# Patient Record
Sex: FEMALE | Race: WHITE | NOT HISPANIC OR LATINO | Employment: OTHER | URBAN - METROPOLITAN AREA
[De-identification: names, ages, dates, MRNs, and addresses within clinical notes are randomized per-mention and may not be internally consistent; named-entity substitution may affect disease eponyms.]

---

## 2017-01-08 ENCOUNTER — HOSPITAL ENCOUNTER (EMERGENCY)
Facility: CLINIC | Age: 72
Discharge: HOME | End: 2017-01-08
Attending: EMERGENCY MEDICINE | Admitting: EMERGENCY MEDICINE
Payer: COMMERCIAL

## 2017-01-08 ENCOUNTER — APPOINTMENT (OUTPATIENT)
Dept: CT IMAGING | Facility: CLINIC | Age: 72
End: 2017-01-08
Attending: EMERGENCY MEDICINE
Payer: COMMERCIAL

## 2017-01-08 VITALS
WEIGHT: 136 LBS | BODY MASS INDEX: 21.86 KG/M2 | OXYGEN SATURATION: 95 % | HEART RATE: 76 BPM | SYSTOLIC BLOOD PRESSURE: 154 MMHG | RESPIRATION RATE: 18 BRPM | HEIGHT: 66 IN | TEMPERATURE: 97.7 F | DIASTOLIC BLOOD PRESSURE: 75 MMHG

## 2017-01-08 DIAGNOSIS — G44.209 TENSION HEADACHE: ICD-10-CM

## 2017-01-08 LAB
ALBUMIN UR-MCNC: NEGATIVE MG/DL
ANION GAP SERPL CALCULATED.3IONS-SCNC: 5 MMOL/L (ref 3–14)
APPEARANCE UR: CLEAR
BASOPHILS # BLD AUTO: 0.1 10E9/L (ref 0–0.2)
BASOPHILS NFR BLD AUTO: 0.8 %
BILIRUB UR QL STRIP: NEGATIVE
BUN SERPL-MCNC: 12 MG/DL (ref 7–30)
CALCIUM SERPL-MCNC: 9.3 MG/DL (ref 8.5–10.1)
CHLORIDE SERPL-SCNC: 106 MMOL/L (ref 94–109)
CO2 SERPL-SCNC: 29 MMOL/L (ref 20–32)
COLOR UR AUTO: NORMAL
CREAT SERPL-MCNC: 0.65 MG/DL (ref 0.52–1.04)
DIFFERENTIAL METHOD BLD: NORMAL
EOSINOPHIL # BLD AUTO: 0.2 10E9/L (ref 0–0.7)
EOSINOPHIL NFR BLD AUTO: 3.1 %
ERYTHROCYTE [DISTWIDTH] IN BLOOD BY AUTOMATED COUNT: 12.5 % (ref 10–15)
ERYTHROCYTE [SEDIMENTATION RATE] IN BLOOD BY WESTERGREN METHOD: 9 MM/H (ref 0–30)
GFR SERPL CREATININE-BSD FRML MDRD: 90 ML/MIN/1.7M2
GLUCOSE SERPL-MCNC: 97 MG/DL (ref 70–99)
GLUCOSE UR STRIP-MCNC: NEGATIVE MG/DL
HCT VFR BLD AUTO: 41.9 % (ref 35–47)
HGB BLD-MCNC: 14 G/DL (ref 11.7–15.7)
HGB UR QL STRIP: NEGATIVE
IMM GRANULOCYTES # BLD: 0 10E9/L (ref 0–0.4)
IMM GRANULOCYTES NFR BLD: 0.3 %
KETONES UR STRIP-MCNC: NEGATIVE MG/DL
LEUKOCYTE ESTERASE UR QL STRIP: NEGATIVE
LYMPHOCYTES # BLD AUTO: 1.7 10E9/L (ref 0.8–5.3)
LYMPHOCYTES NFR BLD AUTO: 22.3 %
MCH RBC QN AUTO: 31.4 PG (ref 26.5–33)
MCHC RBC AUTO-ENTMCNC: 33.4 G/DL (ref 31.5–36.5)
MCV RBC AUTO: 94 FL (ref 78–100)
MONOCYTES # BLD AUTO: 0.5 10E9/L (ref 0–1.3)
MONOCYTES NFR BLD AUTO: 6.2 %
NEUTROPHILS # BLD AUTO: 5.2 10E9/L (ref 1.6–8.3)
NEUTROPHILS NFR BLD AUTO: 67.3 %
NITRATE UR QL: NEGATIVE
NRBC # BLD AUTO: 0 10*3/UL
NRBC BLD AUTO-RTO: 0 /100
PH UR STRIP: 7 PH (ref 5–7)
PLATELET # BLD AUTO: 279 10E9/L (ref 150–450)
POTASSIUM SERPL-SCNC: 3.9 MMOL/L (ref 3.4–5.3)
RBC # BLD AUTO: 4.46 10E12/L (ref 3.8–5.2)
RBC #/AREA URNS AUTO: 0 /HPF (ref 0–2)
SODIUM SERPL-SCNC: 140 MMOL/L (ref 133–144)
SP GR UR STRIP: 1 (ref 1–1.03)
URN SPEC COLLECT METH UR: NORMAL
UROBILINOGEN UR STRIP-MCNC: NORMAL MG/DL (ref 0–2)
WBC # BLD AUTO: 7.8 10E9/L (ref 4–11)
WBC #/AREA URNS AUTO: <1 /HPF (ref 0–2)

## 2017-01-08 PROCEDURE — 70450 CT HEAD/BRAIN W/O DYE: CPT

## 2017-01-08 PROCEDURE — 80048 BASIC METABOLIC PNL TOTAL CA: CPT | Performed by: EMERGENCY MEDICINE

## 2017-01-08 PROCEDURE — 25000128 H RX IP 250 OP 636: Performed by: EMERGENCY MEDICINE

## 2017-01-08 PROCEDURE — 85652 RBC SED RATE AUTOMATED: CPT | Performed by: EMERGENCY MEDICINE

## 2017-01-08 PROCEDURE — 25000125 ZZHC RX 250: Performed by: EMERGENCY MEDICINE

## 2017-01-08 PROCEDURE — 96374 THER/PROPH/DIAG INJ IV PUSH: CPT

## 2017-01-08 PROCEDURE — 99285 EMERGENCY DEPT VISIT HI MDM: CPT | Mod: 25

## 2017-01-08 PROCEDURE — 36415 COLL VENOUS BLD VENIPUNCTURE: CPT | Performed by: EMERGENCY MEDICINE

## 2017-01-08 PROCEDURE — 81001 URINALYSIS AUTO W/SCOPE: CPT | Performed by: EMERGENCY MEDICINE

## 2017-01-08 PROCEDURE — 96361 HYDRATE IV INFUSION ADD-ON: CPT

## 2017-01-08 PROCEDURE — 85025 COMPLETE CBC W/AUTO DIFF WBC: CPT | Performed by: EMERGENCY MEDICINE

## 2017-01-08 PROCEDURE — 93005 ELECTROCARDIOGRAM TRACING: CPT

## 2017-01-08 PROCEDURE — 96375 TX/PRO/DX INJ NEW DRUG ADDON: CPT

## 2017-01-08 RX ORDER — DIPHENHYDRAMINE HYDROCHLORIDE 50 MG/ML
25 INJECTION INTRAMUSCULAR; INTRAVENOUS ONCE
Status: COMPLETED | OUTPATIENT
Start: 2017-01-08 | End: 2017-01-08

## 2017-01-08 RX ORDER — SODIUM CHLORIDE 9 MG/ML
1000 INJECTION, SOLUTION INTRAVENOUS CONTINUOUS
Status: DISCONTINUED | OUTPATIENT
Start: 2017-01-08 | End: 2017-01-08 | Stop reason: HOSPADM

## 2017-01-08 RX ADMIN — SODIUM CHLORIDE 1000 ML: 9 INJECTION, SOLUTION INTRAVENOUS at 10:46

## 2017-01-08 RX ADMIN — DIPHENHYDRAMINE HYDROCHLORIDE 25 MG: 50 INJECTION, SOLUTION INTRAMUSCULAR; INTRAVENOUS at 10:46

## 2017-01-08 RX ADMIN — PROCHLORPERAZINE EDISYLATE 10 MG: 5 INJECTION INTRAMUSCULAR; INTRAVENOUS at 10:46

## 2017-01-08 ASSESSMENT — ENCOUNTER SYMPTOMS
ABDOMINAL DISTENTION: 0
HEADACHES: 1
WEAKNESS: 0
ABDOMINAL PAIN: 0

## 2017-01-08 NOTE — ED AVS SNAPSHOT
[facility] Emergency Department    [address]    [facility] [address]    Phone:  [phone]    Fax:  [phone]                                       Romy Schwartz   MRN: [id number]    Department:  [facility] Emergency Department   Date of Visit:  1/8/2017           After Visit Summary Signature Page     I have received my discharge instructions, and my questions have been answered. I have discussed any challenges I see with this plan with the nurse or doctor.    ..........................................................................................................................................  Patient/Patient Representative Signature      ..........................................................................................................................................  Patient Representative Print Name and Relationship to Patient    ..................................................               ................................................  Date                                            Time    ..........................................................................................................................................  Reviewed by Signature/Title    ...................................................              ..............................................  Date                                                            Time

## 2017-01-08 NOTE — ED NOTES
Pt presents to ED for evaluation of headache since Tuesday.  Pt taking tylenol with minimal relief.  Pt notes today the headache is radiating down in to her jaw.  ABCD intact.

## 2017-01-08 NOTE — ED AVS SNAPSHOT
[facility] Emergency Department    [address]    [facility] [address]    Phone:  [phone]    Fax:  [phone]                                       Romy Schwartz   MRN: [id number]    Department:  [facility] Emergency Department   Date of Visit:  1/8/2017           Patient Information     Date Of Birth          1945        Your diagnoses for this visit were:     Tension headache        You were seen by Sanjeev Galan MD.      Follow-up Information     Follow up with Mick Mendenhall MD In 2 days.    Specialty:  Internal Medicine    Why:  as scheduled    Contact information:    [facility]  [address] [facility] [address] [phone]          Discharge Instructions          * HEADACHE [unspecified]    The cause of your headache today is not clear, but it does not appear to be the sign of any serious illness.  Under stress, some people tense the muscles of their shoulder, neck and scalp without knowing it. If this condition lasts long enough, a TENSION HEADACHE can occur.  A MIGRAINE HEADACHE is caused by changes in blood flow to the brain. It can be mild or severe. A migraine attack may be triggered by emotional stress, hormone changes during the menstrual cycle, oral contraceptives, alcohol use, certain foods containing tyramine, eye strain, weather changes, missing meals, lack of sleep or oversleeping.  Other causes of headache include a viral illness, sinus, ear or throat infection, dental pain and TMJ (jaw joint) pain.  HOME CARE:    If you were given pain medicine for this headache, do not drive yourself home. Arrange for a ride, instead. When you get home, try to sleep. You should feel much better when you wake up.    If you are having nausea or vomiting, follow a light diet until your headache is relieved.    If you have a migraine type headache, use sunglasses when in the daylight or around bright indoor lighting until symptoms  improve. Bright glaring light can worsen this kind of headache.  FOLLOW UP with your doctor if the headache is not better within the next 24 hours. If you have frequent headaches you should discuss a treatment plan with your primary care doctor. By being aware of the earliest signs of headache, and starting treatment right away, you may be able to stop the pain yourself.  GET PROMPT MEDICAL ATTENTION if any of the following occur:    Worsening of your head pain or no improvement within 24 hours    Repeated vomiting (unable to keep liquids down)    Fever over 101 F (38.3 C)    Stiff neck    Extreme drowsiness, confusion or fainting    Weakness of an arm or leg or one side of the face    Difficulty with speech or vision    7249-3949 [facility], [address] [place] [address], [place] [address]. All rights reserved. This information is not intended as a substitute for professional medical care. Always follow your healthcare professional's instructions.      Future Appointments        Provider Department Dept Phone Center    1/10/2017 7:20 AM Mick Mendenhall MD [facility] [phone]       24 Hour Appointment Hotline       To make an appointment at any [facility], call [phone]). If you don't have a family doctor or clinic, we will help you find one. [facility] are conveniently located to serve the needs of you and your family.             Review of your medicines      Our records show that you are taking the medicines listed below. If these are incorrect, please call your family doctor or clinic.        Dose / Directions Last dose taken    ascorbic acid 1000 MG Tabs tablet        1 TABLET DAILY   Refills:  0        aspirin 81 MG tablet   Dose:  1 tablet        Take 1 tablet by mouth daily.   Refills:  0        BIOTIN 5000 PO        Refills:  0        calcium carbonate 500 MG tablet   Commonly known as:  OS-GERALDINE 500 mg Jena. Ca   Dose:  500 mg        Take 500 mg  by mouth 2 times daily   Refills:  0        cefUROXime 500 MG tablet   Commonly known as:  CEFTIN   Dose:  500 mg   Quantity:  20 tablet        Take 1 tablet (500 mg) by mouth 2 times daily   Refills:  0        cholecalciferol 1000 UNITS capsule   Commonly known as:  vitamin  -D   Dose:  1 capsule        Take 1 capsule by mouth daily.   Refills:  0        fluticasone 50 MCG/ACT spray   Commonly known as:  FLONASE   Dose:  2 spray   Quantity:  16 g        Spray 2 sprays into both nostrils daily   Refills:  0        Multiple vitamin Tabs        daily   Refills:  0        order for DME   Quantity:  1 Device        Equipment being ordered: right flat sole shoe   Refills:  0                Procedures and tests performed during your visit     Basic metabolic panel    CBC with platelets differential    CT Head w/o Contrast    EKG 12-lead, tracing only    Erythrocyte sedimentation rate auto    UA with Microscopic      Orders Needing Specimen Collection     None      Pending Results     Date and Time Order Name Status Description    1/8/2017 1021 EKG 12-lead, tracing only Preliminary     1/8/2017 1021 CT Head w/o Contrast Preliminary             Pending Culture Results     No orders found from 1/7/2017 to 1/9/2017.       Test Results from your hospital stay           1/8/2017 12:18 PM - Interface, Radiant Ib      Narrative     CT HEAD WITHOUT CONTRAST 1/8/2017 11:46 AM     HISTORY:  Headache.    TECHNIQUE: Axial images of the head without IV contrast material.  Radiation dose for this scan was reduced using automated exposure  control, adjustment of the mA and/or kV according to patient size, or  iterative reconstruction technique.    COMPARISON: None.    FINDINGS: No intracranial hemorrhage, mass lesion, or acute infarct is  seen. Some age-related atrophy. There is mild white matter  low-density, likely related to small vessel ischemic disease. Mild  age-indeterminate right mastoid fluid.         Impression     IMPRESSION:  No acute intracranial pathology. Mild age-indeterminate  right mastoid fluid.         1/8/2017 11:03 AM - Interface, Flexilab Results      Component Results     Component Value Ref Range & Units Status    Color Urine Light Yellow  Final    Appearance Urine Clear  Final    Glucose Urine Negative NEG mg/dL Final    Bilirubin Urine Negative NEG Final    Ketones Urine Negative NEG mg/dL Final    Specific Gravity Urine 1.004 1.003 - 1.035 Final    Blood Urine Negative NEG Final    pH Urine 7.0 5.0 - 7.0 pH Final    Protein Albumin Urine Negative NEG mg/dL Final    Urobilinogen mg/dL Normal 0.0 - 2.0 mg/dL Final    Nitrite Urine Negative NEG Final    Leukocyte Esterase Urine Negative NEG Final    Source Midstream Urine  Final    WBC Urine <1 0 - 2 /HPF Final    RBC Urine 0 0 - 2 /HPF Final         1/8/2017 11:22 AM - Interface, Flexilab Results      Component Results     Component Value Ref Range & Units Status    Sodium 140 133 - 144 mmol/L Final    Potassium 3.9 3.4 - 5.3 mmol/L Final    Chloride 106 94 - 109 mmol/L Final    Carbon Dioxide 29 20 - 32 mmol/L Final    Anion Gap 5 3 - 14 mmol/L Final    Glucose 97 70 - 99 mg/dL Final    Urea Nitrogen 12 7 - 30 mg/dL Final    Creatinine 0.65 0.52 - 1.04 mg/dL Final    GFR Estimate 90 >60 mL/min/1.7m2 Final    Non  GFR Calc    GFR Estimate If Black >90   GFR Calc   >60 mL/min/1.7m2 Final    Calcium 9.3 8.5 - 10.1 mg/dL Final         1/8/2017 11:09 AM - Interface, Flexilab Results      Component Results     Component Value Ref Range & Units Status    WBC 7.8 4.0 - 11.0 10e9/L Final    RBC Count 4.46 3.8 - 5.2 10e12/L Final    Hemoglobin 14.0 11.7 - 15.7 g/dL Final    Hematocrit 41.9 35.0 - 47.0 % Final    MCV 94 78 - 100 fl Final    MCH 31.4 26.5 - 33.0 pg Final    MCHC 33.4 31.5 - 36.5 g/dL Final    RDW 12.5 10.0 - 15.0 % Final    Platelet Count 279 150 - 450 10e9/L Final    Diff Method Automated Method  Final    % Neutrophils 67.3 % Final     % Lymphocytes 22.3 % Final    % Monocytes 6.2 % Final    % Eosinophils 3.1 % Final    % Basophils 0.8 % Final    % Immature Granulocytes 0.3 % Final    Nucleated RBCs 0 0 /100 Final    Absolute Neutrophil 5.2 1.6 - 8.3 10e9/L Final    Absolute Lymphocytes 1.7 0.8 - 5.3 10e9/L Final    Absolute Monocytes 0.5 0.0 - 1.3 10e9/L Final    Absolute Eosinophils 0.2 0.0 - 0.7 10e9/L Final    Absolute Basophils 0.1 0.0 - 0.2 10e9/L Final    Abs Immature Granulocytes 0.0 0 - 0.4 10e9/L Final    Absolute Nucleated RBC 0.0  Final         1/8/2017 11:21 AM - Interface, Flexilab Results      Component Results     Component Value Ref Range & Units Status    Sed Rate 9 0 - 30 mm/h Final                Clinical Quality Measure: Blood Pressure Screening     Your blood pressure was checked while you were in the emergency department today. The last reading we obtained was  BP: (!) 175/91 mmHg . Please read the guidelines below about what these numbers mean and what you should do about them.  If your systolic blood pressure (the top number) is less than 120 and your diastolic blood pressure (the bottom number) is less than 80, then your blood pressure is normal. There is nothing more that you need to do about it.  If your systolic blood pressure (the top number) is 120-139 or your diastolic blood pressure (the bottom number) is 80-89, your blood pressure may be higher than it should be. You should have your blood pressure rechecked within a year by a primary care provider.  If your systolic blood pressure (the top number) is 140 or greater or your diastolic blood pressure (the bottom number) is 90 or greater, you may have high blood pressure. High blood pressure is treatable, but if left untreated over time it can put you at risk for heart attack, stroke, or kidney failure. You should have your blood pressure rechecked by a primary care provider within the next 4 weeks.  If your provider in the emergency department today gave you  specific instructions to follow-up with your doctor or provider even sooner than that, you should follow that instruction and not wait for up to 4 weeks for your follow-up visit.        Thank you for choosing [facility]       Thank you for choosing [facility] for your care. Our goal is always to provide you with excellent care. Hearing back from our patients is one way we can continue to improve our services. Please take a few minutes to complete the written survey that you may receive in the mail after you visit with us. Thank you!        [facility] Information     [facility] gives you secure access to your electronic health record. If you see a primary care provider, you can also send messages to your care team and make appointments. If you have questions, please call your primary care clinic.  If you do not have a primary care provider, please call [phone] and they will assist you.        Care EveryWhere ID     This is your Care EveryWhere ID. This could be used by other organizations to access your [facility] medical records  [id number]        After Visit Summary       This is your record. Keep this with you and show to your community pharmacist(s) and doctor(s) at your next visit.

## 2017-01-08 NOTE — ED PROVIDER NOTES
"  History     Chief Complaint:  Headache      HPI   Romy Schwartz is a 71 year old female who presents with headache. The patient reports 5 days ago she gradually developed a headache localized to the back of her head which radiates down into her face that has been constant since. The patient notes she typically does not have headaches, although sometimes with illness, but feels this is more severe and unlike previous headaches. The patient denies difficulty with vision/hearing, difficulties with swallowing, rash, focal weakness, significant abdominal pain, chest pain, leg swelling, or any associated symptoms. The patient notes prior to onset of her symptoms she had what she believes was an anxiety attack but is unsure if this is related.     Allergies:  The patient has no known allergies to medications.       Medications:    Ceftin  Flonase  Aspirin 81 mg    Past Medical History:    Osteopenia  Arthritis     Past Surgical History:    BTL  Tooth extraction  Spine fusion  Open reduction humerus    Family History:    Heart disease  Lipids  Cancer    Social History:  Single.  The patient denies smoking.   The patient consumes alcohol occasionally.      Review of Systems   Eyes: Negative for visual disturbance.   Cardiovascular: Negative for chest pain and leg swelling.   Gastrointestinal: Negative for abdominal pain and abdominal distention.   Skin: Negative for rash.   Neurological: Positive for headaches. Negative for weakness.   All other systems reviewed and are negative.    Physical Exam   First Vitals:  BP: (!) 175/91 mmHg  Pulse: 76  Temp: 97.7  F (36.5  C)  Resp: 18  Height: 167.6 cm (5' 6\")  Weight: 61.689 kg (136 lb)  SpO2: 98 %    Physical Exam  Constitutional: Patient is well appearing. No distress.  Head: Atraumatic.  Mouth/Throat: Oropharynx is clear and moist. No oropharyngeal exudate.  Eyes: Conjunctivae and EOM are normal. No scleral icterus.  Neck: Normal range of motion. Neck supple. " "  Cardiovascular: Normal rate, regular rhythm, normal heart sounds and intact distal pulses.   Pulmonary/Chest: Breath sounds normal. No respiratory distress.  Abdominal: Soft. Bowel sounds are normal. No distension. No tenderness. No rebound or guarding.   Musculoskeletal: Normal range of motion. No edema or tenderness.   Neuro: Alert, oriented x3, PERRL, EOMI, CN 2-7 and 9-12 intact, 5/5 grasp BUE, 5/5 elbow flexion and extension BUE, 5/5 shoulder abduction BUE, 5/5 hip flexion, knee flexion, knee extension, plantar and dorsiflexion BLE, no pronator drift, normal gait, negative romberg, no dysdiadochokinesia, normal finger-nose-finger testing    Skin: Warm and dry. No rash noted. Not diaphoretic.     Emergency Department Course   ECG:  Completed at 1032.  Read at 1035.   Rate 69 bpm. OK interval 132. QRS duration 86. QT/QTc 388/415. P-R-T axes 73 60 47. normal sinus rhythm, normal ECG     Imaging:  Radiographic findings were communicated with the patient who voiced understanding of the findings.    CT Head w/o Contrast:   No acute intracranial pathology. Mild age-indeterminate  right mastoid fluid.  Results per radiology.     Laboratory:  BMP: WNL (Creat 0.65)   CBC: WNL (WBC 7.8, HGB 14.0, )   ESR: 9  UA: WNL    Interventions:  Benadryl 25 mg IV  Compazine 10 mg IV  0.9% sodium chloride infusion 1000 mL    Emergency Department Course:  Nursing notes and vitals reviewed.  I performed an exam of the patient as documented above.     Blood drawn and urine collected. This was sent to the lab for further testing, results above.     The above imaging study(s) and ECG were ordered with results noted above.     The patient received the intervention(s) above.     Upon reevaluation the patient is \"90 to 100% better.\" She has eaten and is requesting discharge. The patient has PCP appointment scheduled for this coming Tuesday in 2 days.     Findings and plan explained to the Patient. Patient discharged home with " instructions regarding supportive care, medications, and reasons to return. The importance of close follow-up was reviewed.     Impression & Plan    Medical Decision Making:  No red flags HA.  Great relief in ED.  Unlikely vascular or ICH or other surgical emergency also screened for systemic causes and neg at this time.  Feels very reassured and wants to go home.      All questions and concerns were answered. The patient was discharged home and recommended to follow up with his primary physician and return with any new or worsening symptoms.     Diagnosis:    ICD-10-CM    1. Tension headache G44.209        Disposition:  Discharged.    Irwin PÉREZ, am serving as a scribe at 12:36 PM on 1/8/2017 to document services personally performed by Sanjeev Galan MD, based on my observations and the provider's statements to me.    [facility] EMERGENCY DEPARTMENT        Sanjeev Galan MD  01/08/17 2132

## 2017-01-08 NOTE — DISCHARGE INSTRUCTIONS
* HEADACHE [unspecified]    The cause of your headache today is not clear, but it does not appear to be the sign of any serious illness.  Under stress, some people tense the muscles of their shoulder, neck and scalp without knowing it. If this condition lasts long enough, a TENSION HEADACHE can occur.  A MIGRAINE HEADACHE is caused by changes in blood flow to the brain. It can be mild or severe. A migraine attack may be triggered by emotional stress, hormone changes during the menstrual cycle, oral contraceptives, alcohol use, certain foods containing tyramine, eye strain, weather changes, missing meals, lack of sleep or oversleeping.  Other causes of headache include a viral illness, sinus, ear or throat infection, dental pain and TMJ (jaw joint) pain.  HOME CARE:    If you were given pain medicine for this headache, do not drive yourself home. Arrange for a ride, instead. When you get home, try to sleep. You should feel much better when you wake up.    If you are having nausea or vomiting, follow a light diet until your headache is relieved.    If you have a migraine type headache, use sunglasses when in the daylight or around bright indoor lighting until symptoms improve. Bright glaring light can worsen this kind of headache.  FOLLOW UP with your doctor if the headache is not better within the next 24 hours. If you have frequent headaches you should discuss a treatment plan with your primary care doctor. By being aware of the earliest signs of headache, and starting treatment right away, you may be able to stop the pain yourself.  GET PROMPT MEDICAL ATTENTION if any of the following occur:    Worsening of your head pain or no improvement within 24 hours    Repeated vomiting (unable to keep liquids down)    Fever over 101 F (38.3 C)    Stiff neck    Extreme drowsiness, confusion or fainting    Weakness of an arm or leg or one side of the face    Difficulty with speech or vision    5825-4355 [address], [address]  [place] [address]. All rights reserved. This information is not intended as a substitute for professional medical care. Always follow your healthcare professional's instructions.

## 2017-01-09 LAB — INTERPRETATION ECG - MUSE: NORMAL

## 2017-01-10 ENCOUNTER — RADIANT APPOINTMENT (OUTPATIENT)
Dept: MAMMOGRAPHY | Facility: CLINIC | Age: 72
End: 2017-01-10
Attending: INTERNAL MEDICINE
Payer: COMMERCIAL

## 2017-01-10 ENCOUNTER — OFFICE VISIT (OUTPATIENT)
Dept: INTERNAL MEDICINE | Facility: CLINIC | Age: 72
End: 2017-01-10
Payer: COMMERCIAL

## 2017-01-10 ENCOUNTER — TELEPHONE (OUTPATIENT)
Dept: INTERNAL MEDICINE | Facility: CLINIC | Age: 72
End: 2017-01-10

## 2017-01-10 VITALS
HEART RATE: 71 BPM | WEIGHT: 136.8 LBS | DIASTOLIC BLOOD PRESSURE: 72 MMHG | TEMPERATURE: 98 F | OXYGEN SATURATION: 99 % | HEIGHT: 66 IN | SYSTOLIC BLOOD PRESSURE: 122 MMHG | BODY MASS INDEX: 21.98 KG/M2

## 2017-01-10 DIAGNOSIS — Z00.00 MEDICARE ANNUAL WELLNESS VISIT, SUBSEQUENT: Primary | ICD-10-CM

## 2017-01-10 DIAGNOSIS — Z12.11 COLON CANCER SCREENING: ICD-10-CM

## 2017-01-10 DIAGNOSIS — Z12.31 VISIT FOR SCREENING MAMMOGRAM: ICD-10-CM

## 2017-01-10 DIAGNOSIS — Z13.6 CARDIOVASCULAR SCREENING; LDL GOAL LESS THAN 160: ICD-10-CM

## 2017-01-10 DIAGNOSIS — Z23 NEED FOR TD VACCINE: ICD-10-CM

## 2017-01-10 LAB
CHOLEST SERPL-MCNC: 230 MG/DL
HDLC SERPL-MCNC: 78 MG/DL
LDLC SERPL CALC-MCNC: 135 MG/DL
NONHDLC SERPL-MCNC: 152 MG/DL
TRIGL SERPL-MCNC: 83 MG/DL

## 2017-01-10 PROCEDURE — 80061 LIPID PANEL: CPT | Performed by: INTERNAL MEDICINE

## 2017-01-10 PROCEDURE — G0202 SCR MAMMO BI INCL CAD: HCPCS | Mod: TC

## 2017-01-10 PROCEDURE — 36415 COLL VENOUS BLD VENIPUNCTURE: CPT | Performed by: INTERNAL MEDICINE

## 2017-01-10 PROCEDURE — 99397 PER PM REEVAL EST PAT 65+ YR: CPT | Mod: 25 | Performed by: INTERNAL MEDICINE

## 2017-01-10 PROCEDURE — 90471 IMMUNIZATION ADMIN: CPT | Performed by: INTERNAL MEDICINE

## 2017-01-10 PROCEDURE — 90714 TD VACC NO PRESV 7 YRS+ IM: CPT | Performed by: INTERNAL MEDICINE

## 2017-01-10 NOTE — PATIENT INSTRUCTIONS
Services Typically covered by Medicare Recommended Completed   Vaccines    Pneumonoccol    Influenza    Hepatitis B (if medium/high risk)     Once for patients after age 65    Yearly  Medium/high risk factors:    End Stage Kidney Disease    Hemophiliacs who received Factor XIII or IX concentrates    Clients of institutions for developmentally disabled    Persons who live in same house as a Hepatitis B carrier    Homosexual men    Illicit injectable drug users    Health care workers     Mammogram Covered: One-time screen between age 35-39, annually for age 40+     Pap and Pelvic Exam Covered: Annually if  high risk,  or childbearing age with abnormal Pap in last 3 years.  Q24 months for all other women     Prostate Cancer Screening    Digital rectal exam    PSA Covered: Annually for all men > age 50     Corolrectal Cancer Screening Screening colonoscopy every 10 years, more often for high risk patients     Diabetes Self-Management Training Requires referral by treating physician for patient with diabetes     Diabetes Screening    Fasting blood sugar or glucose tolerance test   Once yearly, twice yearly if prediabetic     Cardiovascular Screening Blood Tests    Total Cholesterol    HDL    Triglycerides Every 5 years     Medical Nutrition Therapy for Diabetes or Renal Disease Requires referral by treating physician for patient with diabetes or kidney disease     Glaucoma Screening Annually for patients with one of the following risk factors:    Diabetes Mellitus    Family history of Glaucoma    -American age 50 and over    -American age 65 and over     Bone Mass Measurement Every 24 months if one of the following risk factors:    Estrogen deficiency    Vertebral abnormalities on x-ray indicative of Osteoporosis, Osteopenia, or Vertebral fracture    Receiving/expected to receive the equivalent of at least 5 mg of Prednisone per day for > 3 months    Hyperparathyroidism    Patient being monitored for  response to Osteoporosis Therapy     One-time AAA screen  Must be ordered as part of Medicare IPPE   Any patient with a family history of AAA    Males Age 65-75, with history of smoking at least 100 cigarettes in lifetime     Smoking Cessation Counseling Beneficiaries who use tobacco are eligible to receive 2 cessation attempts per year; each attempt includes maximum of 4 sessions     HIV Screening Annually for beneficiaries at increased risk:       Increased risk for HIV infection is defined in the  National Coverage Determinations (NCD) Manual,  Publication 100-03 Sections 190.14 (diagnostic) and 210.7 (screening). See http://www.cms.gov/manuals/downloads/rxf535e6_Hwsd1.pdf and http://www.cms.gov/manuals/downloads/qxi223h4_Flri6.pdf on the Internet.  Three times per pregnancy for beneficiaries who are pregnant.     Future Annual Wellness Visit Annually, for all beneficiaries.

## 2017-01-10 NOTE — PROGRESS NOTES
SUBJECTIVE:                                                            Romy Schwartz is a 71 year old female who presents for Preventive Visit.    Are you in the first 12 months of your Medicare Part B coverage?  No    Healthy Habits:    Do you get at least three servings of calcium containing foods daily (dairy, green leafy vegetables, etc.)? yes    Amount of exercise or daily activities, outside of work: 7 day(s) per week    Problems taking medications regularly not applicable    Medication side effects: No    Have you had an eye exam in the past two years? yes    Do you see a dentist twice per year? yes    Do you have sleep apnea, excessive snoring or daytime drowsiness?no    Other concerns: None    ED/UC Followup:    Facility:  [facility]  Date of visit: 1/8/16  Reason for visit: Tension HA  Current Status: Resolved       All Histories reviewed and updated in threadsy as appropriate.  Social History   Substance Use Topics     Smoking status: Never Smoker      Smokeless tobacco: Never Used     Alcohol Use: Yes      Comment: occasional       The patient does not drink >3 drinks per day nor >7 drinks per week.    Today's PHQ-2 Score:   PHQ-2 ( 1999 Pfizer) 1/10/2017 12/15/2015   Q1: Little interest or pleasure in doing things 0 0   Q2: Feeling down, depressed or hopeless 0 0   PHQ-2 Score 0 0       Do you feel safe in your environment - Yes    Do you have a Health Care Directive?: Yes: Advance Directive has been received and scanned.    Current providers sharing in care for this patient include:   Patient Care Team:  Mick Mendenhall MD as PCP - General (Internal Medicine)      Hearing impairment: No    Ability to successfully perform activities of daily living: Yes, no assistance needed     Fall risk:  Fall Risk Assessment not completed.    Home safety:  none identified      The following health maintenance items are reviewed in Epic and correct as of today:  Health Maintenance   Topic Date Due     FALL RISK  "ASSESSMENT  12/15/2016     TETANUS Q10 YR  07/05/2017     INFLUENZA VACCINE (SYSTEM ASSIGNED)  09/01/2017     ADVANCE DIRECTIVE PLANNING Q5 YRS (NO INBASKET)  11/07/2017     MAMMO SCREEN Q2 YR (SYSTEM ASSIGNED)  12/30/2017     LIPID SCREEN Q5 YR FEMALE (SYSTEM ASSIGNED)  12/15/2020     COLONOSCOPY Q10 YR INBASKET MESSAGE  05/06/2025     DEXA SCAN SCREENING (SYSTEM ASSIGNED)  Completed     PNEUMOCOCCAL  Completed         Immunization History   Administered Date(s) Administered     Influenza (High Dose) 3 valent vaccine 10/17/2014, 12/15/2015, 10/20/2016     Influenza (IIV3) 11/02/1999, 11/20/2010, 11/13/2012     Influenza Vaccine IM 3yrs+ 4 Valent IIV4 11/12/2013     Pneumococcal (PCV 13) 12/15/2015     Pneumococcal 23 valent 06/08/2010     TD (ADULT, 7+) 07/05/2007     Zoster vaccine, live 05/16/2012        ROS:  C: NEGATIVE for fever, chills, change in weight  E/M: NEGATIVE for ear, mouth and throat problems  R: NEGATIVE for significant cough or SOB  CV: NEGATIVE for chest pain, palpitations or peripheral edema  GI: NEGATIVE for nausea, abdominal pain, heartburn, or change in bowel habits  : NEGATIVE for frequency, dysuria, or hematuria  M: NEGATIVE for significant arthralgias or myalgia  N: NEGATIVE for weakness, dizziness or paresthesias  H: NEGATIVE for bleeding problems  P: NEGATIVE for changes in mood or affect      OBJECTIVE:                                                            LMP 03/20/1995 Estimated body mass index is 22.09 kg/(m^2) as calculated from the following:    Height as of this encounter: 5' 6\" (1.676 m).    Weight as of this encounter: 136 lb 12.8 oz (62.052 kg).   /72 mmHg  Pulse 71  Temp(Src) 98  F (36.7  C) (Oral)  Ht 5' 6\" (1.676 m)  Wt 136 lb 12.8 oz (62.052 kg)  BMI 22.09 kg/m2  SpO2 99%  LMP 03/20/1995    EXAM:   GENERAL: healthy, alert and no distress  EYES: Eyes grossly normal to inspection, PERRL and conjunctivae and sclerae normal  HENT: ear canals and TM's normal, " "nose and mouth without ulcers or lesions  NECK: no adenopathy, no asymmetry, masses, or scars and thyroid normal to palpation  RESP: lungs clear to auscultation - no rales, rhonchi or wheezes  CV: regular rate and rhythm, normal S1 S2, no S3 or S4, no murmur, click or rub, no peripheral edema and peripheral pulses strong  ABDOMEN: soft, nontender, no hepatosplenomegaly, no masses and bowel sounds normal  MS: no gross musculoskeletal defects noted, no edema  NEURO: No focal changes  PSYCH: mentation appears normal, affect normal/bright    ASSESSMENT / PLAN:                                                            1. Medicare annual wellness visit, subsequent  As ordered    2. CARDIOVASCULAR SCREENING; LDL GOAL LESS THAN 160  Stable as noted  - Lipid Profile    3. Colon cancer screening  As ordered  - Fecal colorectal cancer screen (FIT); Future    4. Visit for screening mammogram  recommended  - *MA Screening Digital Bilateral; Future    End of Life Planning:  Patient currently has an advanced directive: Yes.  Practitioner is supportive of decision.    COUNSELING:  Reviewed preventive health counseling, as reflected in patient instructions       Regular exercise       Healthy diet/nutrition      Estimated body mass index is 21.96 kg/(m^2) as calculated from the following:    Height as of 1/8/17: 5' 6\" (1.676 m).    Weight as of 1/8/17: 136 lb (61.689 kg).     reports that she has never smoked. She has never used smokeless tobacco.      Appropriate preventive services were discussed with this patient, including applicable screening as appropriate for cardiovascular disease, diabetes, osteopenia/osteoporosis, and glaucoma.  As appropriate for age/gender, discussed screening for colorectal cancer, prostate cancer, breast cancer, and cervical cancer. Checklist reviewing preventive services available has been given to the patient.    Reviewed patients plan of care and provided an AVS. The Basic Care Plan (routine " screening as documented in Health Maintenance) for Romy meets the Care Plan requirement. This Care Plan has been established and reviewed with the Patient.    Counseling Resources:  ATP IV Guidelines  Pooled Cohorts Equation Calculator  Breast Cancer Risk Calculator  FRAX Risk Assessment  ICSI Preventive Guidelines  Dietary Guidelines for Americans, 2010  USDA's MyPlate  ASA Prophylaxis  Lung CA Screening    Mick Mendenhall MD  [facility]    THE MEDICATION LIST HAS BEEN FULLY RECONCILED BY THE M.D. AND THE NURSING STAFF.

## 2017-01-10 NOTE — NURSING NOTE
"Chief Complaint   Patient presents with     Wellness Visit       Initial /72 mmHg  Pulse 71  Temp(Src) 98  F (36.7  C) (Oral)  Ht 5' 6\" (1.676 m)  Wt 136 lb 12.8 oz (62.052 kg)  BMI 22.09 kg/m2  SpO2 99%  LMP 03/20/1995 Estimated body mass index is 22.09 kg/(m^2) as calculated from the following:    Height as of this encounter: 5' 6\" (1.676 m).    Weight as of this encounter: 136 lb 12.8 oz (62.052 kg).  BP completed using cuff size: jen Jaffe CMA      "

## 2017-03-06 ENCOUNTER — OFFICE VISIT (OUTPATIENT)
Dept: URGENT CARE | Facility: URGENT CARE | Age: 72
End: 2017-03-06
Payer: COMMERCIAL

## 2017-03-06 VITALS
TEMPERATURE: 97.9 F | HEART RATE: 68 BPM | BODY MASS INDEX: 22.11 KG/M2 | OXYGEN SATURATION: 97 % | WEIGHT: 137 LBS | SYSTOLIC BLOOD PRESSURE: 126 MMHG | DIASTOLIC BLOOD PRESSURE: 70 MMHG

## 2017-03-06 DIAGNOSIS — R05.9 COUGH: Primary | ICD-10-CM

## 2017-03-06 DIAGNOSIS — R07.0 THROAT PAIN: ICD-10-CM

## 2017-03-06 DIAGNOSIS — Z20.828 EXPOSURE TO INFLUENZA: ICD-10-CM

## 2017-03-06 LAB
DEPRECATED S PYO AG THROAT QL EIA: NORMAL
FLUAV+FLUBV AG SPEC QL: NEGATIVE
FLUAV+FLUBV AG SPEC QL: NORMAL
MICRO REPORT STATUS: NORMAL
SPECIMEN SOURCE: NORMAL
SPECIMEN SOURCE: NORMAL

## 2017-03-06 PROCEDURE — 87880 STREP A ASSAY W/OPTIC: CPT | Performed by: PHYSICIAN ASSISTANT

## 2017-03-06 PROCEDURE — 87081 CULTURE SCREEN ONLY: CPT | Performed by: PHYSICIAN ASSISTANT

## 2017-03-06 PROCEDURE — 99213 OFFICE O/P EST LOW 20 MIN: CPT | Performed by: PHYSICIAN ASSISTANT

## 2017-03-06 PROCEDURE — 87804 INFLUENZA ASSAY W/OPTIC: CPT | Performed by: PHYSICIAN ASSISTANT

## 2017-03-06 RX ORDER — OSELTAMIVIR PHOSPHATE 75 MG/1
75 CAPSULE ORAL DAILY
Qty: 10 CAPSULE | Refills: 0 | Status: SHIPPED | OUTPATIENT
Start: 2017-03-06 | End: 2017-05-08

## 2017-03-06 NOTE — NURSING NOTE
"Chief Complaint   Patient presents with     Flu Symptoms     Exposed to Influenza        Initial /70 (BP Location: Left arm, Patient Position: Chair, Cuff Size: Adult Regular)  Pulse 68  Temp 97.9  F (36.6  C) (Oral)  Wt 137 lb (62.1 kg)  LMP 03/20/1995  SpO2 97%  BMI 22.11 kg/m2 Estimated body mass index is 22.11 kg/(m^2) as calculated from the following:    Height as of 1/10/17: 5' 6\" (1.676 m).    Weight as of this encounter: 137 lb (62.1 kg).  Medication Reconciliation: complete     "

## 2017-03-06 NOTE — PROGRESS NOTES
SUBJECTIVE:   Romy Schwartz is a 71 year old female presenting with a chief complaint of having cough, mild sore throat and influenza exposure.  Onset of symptoms was last night .  Course of illness is none.    Severity mild  Current and Associated symptoms: sore throat, mild cough  Treatment measures tried include none.  Predisposing factors include mother hospitalized with influenza .    Past Medical History   Diagnosis Date     Arthritis      Normal delivery 1966     no complications with pregnancy or delivery     Urinary incontinence         Allergies   Allergen Reactions     No Known Drug Allergies          Social History   Substance Use Topics     Smoking status: Never Smoker     Smokeless tobacco: Never Used     Alcohol use Yes      Comment: occasional       ROS:  CONSTITUTIONAL:NEGATIVE for fever, chills, change in weight  INTEGUMENTARY/SKIN: NEGATIVE for worrisome rashes, moles or lesions  ENT/MOUTH: POSITIVE for nasal congestion, sore throat  RESP:POSITIVE for cough-non productive  CV: NEGATIVE for chest pain, palpitations or peripheral edema  GI: NEGATIVE for nausea, abdominal pain, heartburn, or change in bowel habits  MUSCULOSKELETAL: NEGATIVE for significant arthralgias or myalgia  NEURO: NEGATIVE for weakness, dizziness or paresthesias    OBJECTIVE  :/70 (BP Location: Left arm, Patient Position: Chair, Cuff Size: Adult Regular)  Pulse 68  Temp 97.9  F (36.6  C) (Oral)  Wt 137 lb (62.1 kg)  LMP 03/20/1995  SpO2 97%  BMI 22.11 kg/m2  GENERAL APPEARANCE: healthy, alert and no distress  EYES: EOMI,  PERRL, conjunctiva clear  HENT: ear canals and TM's normal.  Nose and mouth without ulcers, erythema or lesions  NECK: supple, nontender, no lymphadenopathy  RESP: lungs clear to auscultation - no rales, rhonchi or wheezes  CV: regular rates and rhythm, normal S1 S2, no murmur noted  ABDOMEN:  soft, nontender, no HSM or masses and bowel sounds normal  NEURO: Normal strength and tone, sensory exam  grossly normal,  normal speech and mentation  SKIN: no suspicious lesions or rashes    Results for orders placed or performed in visit on 03/06/17   Influenza A/B antigen   Result Value Ref Range    Influenza A/B Agn Specimen Nasopharyngeal     Influenza A Negative NEG    Influenza B  NEG     Negative   Test results must be correlated with clinical data. If necessary, results   should be confirmed by a molecular assay or viral culture.     Strep, Rapid Screen   Result Value Ref Range    Specimen Description Throat     Rapid Strep A Screen       NEGATIVE: No Group A streptococcal antigen detected by immunoassay, await   culture report.      Micro Report Status FINAL 03/06/2017        ASSESSMENT/PLAN      ICD-10-CM    1. Cough R05 Influenza A/B antigen   2. Throat pain R07.0 Strep, Rapid Screen     Beta strep group A culture   3. Exposure to influenza Z20.828 oseltamivir (TAMIFLU) 75 MG capsule       Patient given information about influenza.  Patient understands they are contagious until their fever has resolved without the use of motrin or tylenol.  At that time they can return to school/work.  Patient is to monitor for any worsening symptoms and return to the clinic if this occurs.  The most common complication of influenza is Pneumonia or other respiratory problems especially in those with underlying lung problems including asthma and COPD.  Patient will follow up if this occurs.

## 2017-03-06 NOTE — MR AVS SNAPSHOT
After Visit Summary   3/6/2017    Romy Schwartz    MRN: [id number]           Patient Information     Date Of Birth          1945        Visit Information        Provider Department      3/6/2017 1:30 PM Wm Pagan PA-C [facility]        Today's Diagnoses     Cough    -  1    Throat pain        Exposure to influenza           Follow-ups after your visit        Who to contact     If you have questions or need follow up information about today's clinic visit or your schedule please contact [facility] directly at [phone].  Normal or non-critical lab and imaging results will be communicated to you by [employer], letter or phone within 4 business days after the clinic has received the results. If you do not hear from us within 7 days, please contact the clinic through [employer] or phone. If you have a critical or abnormal lab result, we will notify you by phone as soon as possible.  Submit refill requests through [employer] or call your pharmacy and they will forward the refill request to us. Please allow 3 business days for your refill to be completed.          Additional Information About Your Visit        MyChart Information     [employer] gives you secure access to your electronic health record. If you see a primary care provider, you can also send messages to your care team and make appointments. If you have questions, please call your primary care clinic.  If you do not have a primary care provider, please call [phone] and they will assist you.        Care EveryWhere ID     This is your Care EveryWhere ID. This could be used by other organizations to access your [place] medical records  [id number]        Your Vitals Were     Pulse Temperature Last Period Pulse Oximetry BMI (Body Mass Index)       68 97.9  F (36.6  C) (Oral) 03/20/1995 97% 22.11 kg/m2        Blood Pressure from Last 3 Encounters:   03/06/17 126/70   01/10/17  122/72   01/08/17 154/75    Weight from Last 3 Encounters:   03/06/17 137 lb (62.1 kg)   01/10/17 136 lb 12.8 oz (62.1 kg)   01/08/17 136 lb (61.7 kg)              We Performed the Following     Beta strep group A culture     Influenza A/B antigen     Strep, Rapid Screen          Today's Medication Changes          These changes are accurate as of: 3/6/17  3:09 PM.  If you have any questions, ask your nurse or doctor.               Start taking these medicines.        Dose/Directions    oseltamivir 75 MG capsule   Commonly known as:  TAMIFLU   Used for:  Exposure to influenza   Started by:  Wm Pagan PA-C        Dose:  75 mg   Take 1 capsule (75 mg) by mouth daily   Quantity:  10 capsule   Refills:  0            Where to get your medicines      These medications were sent to [facility] [address] [place] [address] [address] AT [facility] [place]  [address], [place] [address]     Phone:  [phone]     oseltamivir 75 MG capsule                Primary Care Provider Office Phone # Fax #    Mick Mendenhall [phone]       [facility] [address]  [place] [address]        Thank you!     Thank you for choosing [facility]  for your care. Our goal is always to provide you with excellent care. Hearing back from our patients is one way we can continue to improve our services. Please take a few minutes to complete the written survey that you may receive in the mail after your visit with us. Thank you!             Your Updated Medication List - Protect others around you: Learn how to safely use, store and throw away your medicines at www.disposemymeds.org.          This list is accurate as of: 3/6/17  3:09 PM.  Always use your most recent med list.                   Brand Name Dispense Instructions for use    ascorbic acid 1000 MG Tabs tablet      1 TABLET DAILY       aspirin 81 MG tablet      Take 1  tablet by mouth daily.       BIOTIN 5000 PO          calcium carbonate 500 MG tablet    OS-GERALDINE 500 mg [place]. [place]     Take 500 mg by mouth 2 times daily       cholecalciferol 1000 UNITS capsule    vitamin  -D     Take 1 capsule by mouth daily.       fluticasone 50 MCG/ACT spray    FLONASE    16 g    Spray 2 sprays into both nostrils daily       Multiple vitamin Tabs      daily       order for DME     1 Device    Equipment being ordered: right flat sole shoe       oseltamivir 75 MG capsule    TAMIFLU    10 capsule    Take 1 capsule (75 mg) by mouth daily

## 2017-03-08 LAB
BACTERIA SPEC CULT: NORMAL
MICRO REPORT STATUS: NORMAL
SPECIMEN SOURCE: NORMAL

## 2017-05-08 ENCOUNTER — OFFICE VISIT (OUTPATIENT)
Dept: INTERNAL MEDICINE | Facility: CLINIC | Age: 72
End: 2017-05-08
Payer: COMMERCIAL

## 2017-05-08 ENCOUNTER — RADIANT APPOINTMENT (OUTPATIENT)
Dept: GENERAL RADIOLOGY | Facility: CLINIC | Age: 72
End: 2017-05-08
Attending: INTERNAL MEDICINE
Payer: COMMERCIAL

## 2017-05-08 VITALS
DIASTOLIC BLOOD PRESSURE: 72 MMHG | TEMPERATURE: 98.4 F | OXYGEN SATURATION: 98 % | BODY MASS INDEX: 22.18 KG/M2 | HEART RATE: 75 BPM | SYSTOLIC BLOOD PRESSURE: 120 MMHG | HEIGHT: 66 IN | WEIGHT: 138 LBS

## 2017-05-08 DIAGNOSIS — M72.2 PLANTAR FASCIITIS: Primary | ICD-10-CM

## 2017-05-08 DIAGNOSIS — M72.2 PLANTAR FASCIITIS: ICD-10-CM

## 2017-05-08 PROCEDURE — 99213 OFFICE O/P EST LOW 20 MIN: CPT | Performed by: INTERNAL MEDICINE

## 2017-05-08 PROCEDURE — 73630 X-RAY EXAM OF FOOT: CPT | Mod: LT

## 2017-05-08 NOTE — PROGRESS NOTES
SUBJECTIVE:                                                    Romy Schwartz is a 71 year old female who presents to clinic today for the following health issues:    Musculoskeletal problem/pain      Duration: 1 week    Description  Location: Left lateral heel     Intensity:  moderate    Accompanying signs and symptoms: Pain with weight on foot     History  Previous similar problem: no   Previous evaluation:  none    Precipitating or alleviating factors:  Trauma or overuse: no- does a lot of hiking and walking   Aggravating factors include: standing and walking    Therapies tried and outcome: ice and NSAID - help       Problem list and histories reviewed & adjusted, as indicated.  Additional history: as documented    Patient Active Problem List   Diagnosis     Lumbago     Osteopenia     CARDIOVASCULAR SCREENING; LDL GOAL LESS THAN 160     Urgency-frequency syndrome     ACP (advance care planning)     Fracture closed, humerus     Anemia due to blood loss, acute     Anxiety     Past Surgical History:   Procedure Laterality Date     C NONSPECIFIC PROCEDURE      BTL     C NONSPECIFIC PROCEDURE       C SPINE FUSION,ANTER,3 SGMTS      cervical spine fusion C4-5, C5-6, C6-7     HC COLONOSCOPY THRU STOMA, DIAGNOSTIC  05    normal colonoscopy     HC TOOTH EXTRACTION W/FORCEP  1964    4 wisdom teeth removed     OPEN REDUCTION INTERNAL FIXATION HUMERUS DISTAL  2014    Procedure: OPEN REDUCTION INTERNAL FIXATION HUMERUS DISTAL;  Surgeon: Yusuf Hedrick MD;  Location:  OR       Social History   Substance Use Topics     Smoking status: Never Smoker     Smokeless tobacco: Never Used     Alcohol use Yes      Comment: occasional     Family History   Problem Relation Age of Onset     HEART DISEASE Father       age 78 also bypass surg     Lipids Mother      alive b.      CANCER Mother      Non Hodgkins     CANCER Brother      d. age 47 myelodysplasia from chemotherapy nonhodgkins lymphoma      "Family History Negative Brother      alive b.  193     Family History Negative Brother      alive age b. 1940     Cancer - colorectal Maternal Grandmother       colon cancer         Current Outpatient Prescriptions   Medication Sig Dispense Refill     order for DME Equipment being ordered: right flat sole shoe 1 Device 0     BIOTIN 5000 PO        calcium carbonate (OS-GERALDINE 500 MG [place]. [place]) 500 MG tablet Take 500 mg by mouth 2 times daily       aspirin 81 MG tablet Take 1 tablet by mouth daily.       cholecalciferol (VITAMIN-D) 1000 UNIT capsule Take 1 capsule by mouth daily.       ASCORBIC ACID 1000 MG PO TABS 1 TABLET DAILY       MULTIPLE VITAMIN PO TABS daily       Allergies   Allergen Reactions     No Known Drug Allergies      BP Readings from Last 3 Encounters:   17 126/70   01/10/17 122/72   17 154/75    Wt Readings from Last 3 Encounters:   17 137 lb (62.1 kg)   01/10/17 136 lb 12.8 oz (62.1 kg)   17 136 lb (61.7 kg)                    Reviewed and updated as needed this visit by clinical staff  Tobacco  Allergies  Med Hx  Surg Hx  Fam Hx  Soc Hx      Reviewed and updated as needed this visit by Provider         ROS:  C: NEGATIVE for fever, chills, change in weight  E/M: NEGATIVE for ear, mouth and throat problems  R: NEGATIVE for significant cough or SOB  CV: NEGATIVE for chest pain, palpitations or peripheral edema  GI: NEGATIVE for nausea, abdominal pain, heartburn, or change in bowel habits  : NEGATIVE for frequency, dysuria, or hematuria  P: NEGATIVE for changes in mood or affect    OBJECTIVE:                                                    /72  Pulse 75  Temp 98.4  F (36.9  C) (Oral)  Ht 5' 6\" (1.676 m)  Wt 138 lb (62.6 kg)  LMP 1995  SpO2 98%  BMI 22.27 kg/m2  There is no height or weight on file to calculate BMI.  GENERAL: healthy, alert and no distress  RESP: lungs clear to auscultation - no rales, no rhonchi, no wheezes  CV: regular rates and " rhythm, normal S1 S2, no S3 or S4 and no murmur, no click or rub -  ABDOMEN: soft, no tenderness, no  hepatosplenomegaly, no masses, normal bowel sounds  MS: extremities- no gross deformities noted, no edema  MS: +mild pain plantar aspect foot, left, insertion site.  NEURO: strength and tone- normal, sensory exam- grossly normal, mentation- intact, speech- normal, reflexes- symmetric  PSYCH: Alert and oriented times 3; speech- coherent , normal rate and volume; able to articulate logical thoughts, able to abstract reason, no tangential thoughts, no hallucinations or delusions, affect- normal       ASSESSMENT/PLAN:                                                      (M72.2) Plantar fasciitis  (primary encounter diagnosis)  Comment: will image, discussed optios of care  Plan: XR Foot Left G/E 3 Views        Ice, stretching    See Patient Instructions    Mick Mendenhall MD  [facility]

## 2017-05-08 NOTE — MR AVS SNAPSHOT
After Visit Summary   5/8/2017    Romy Schwartz    MRN: [id number]           Patient Information     Date Of Birth          1945        Visit Information        Provider Department      5/8/2017 11:00 AM Mick Mendenhall MD [facility]        Today's Diagnoses     Plantar fasciitis    -  1       Follow-ups after your visit        Follow-up notes from your care team     Return if symptoms worsen or fail to improve.      Future tests that were ordered for you today     Open Future Orders        Priority Expected Expires Ordered    XR Foot Left G/E 3 Views Routine 5/8/2017 5/8/2018 5/8/2017            Who to contact     If you have questions or need follow up information about today's clinic visit or your schedule please contact [facility] directly at [phone].  Normal or non-critical lab and imaging results will be communicated to you by MyChart, letter or phone within 4 business days after the clinic has received the results. If you do not hear from us within 7 days, please contact the clinic through MyChart or phone. If you have a critical or abnormal lab result, we will notify you by phone as soon as possible.  Submit refill requests through [employer] or call your pharmacy and they will forward the refill request to us. Please allow 3 business days for your refill to be completed.          Additional Information About Your Visit        MyChart Information     [employer] gives you secure access to your electronic health record. If you see a primary care provider, you can also send messages to your care team and make appointments. If you have questions, please call your primary care clinic.  If you do not have a primary care provider, please call [phone] and they will assist you.        Care EveryWhere ID     This is your Care EveryWhere ID. This could be used by other organizations to access your [place] medical records  [id number]       "  Your Vitals Were     Pulse Temperature Height Last Period Pulse Oximetry BMI (Body Mass Index)    75 98.4  F (36.9  C) (Oral) 5' 6\" (1.676 m) 03/20/1995 98% 22.27 kg/m2       Blood Pressure from Last 3 Encounters:   05/08/17 120/72   03/06/17 126/70   01/10/17 122/72    Weight from Last 3 Encounters:   05/08/17 138 lb (62.6 kg)   03/06/17 137 lb (62.1 kg)   01/10/17 136 lb 12.8 oz (62.1 kg)               Primary Care Provider Office Phone # Fax #    Mick Mendenhall [phone]       [facility] [place] [address] [place] [id number]        Thank you!     Thank you for choosing [facility]  for your care. Our goal is always to provide you with excellent care. Hearing back from our patients is one way we can continue to improve our services. Please take a few minutes to complete the written survey that you may receive in the mail after your visit with us. Thank you!             Your Updated Medication List - Protect others around you: Learn how to safely use, store and throw away your medicines at www.disposemymeds.org.          This list is accurate as of: 5/8/17 11:08 AM.  Always use your most recent med list.                   Brand Name Dispense Instructions for use    ascorbic acid 1000 MG Tabs tablet      1 TABLET DAILY       aspirin 81 MG tablet      Take 1 tablet by mouth daily.       BIOTIN 5000 PO          calcium carbonate 500 MG tablet    OS-GERALDINE 500 mg [place]. Ca     Take 500 mg by mouth 2 times daily       cholecalciferol 1000 UNITS capsule    vitamin  -D     Take 1 capsule by mouth daily.       Multiple vitamin Tabs      daily       order for DME     1 Device    Equipment being ordered: right flat sole shoe         "

## 2017-05-08 NOTE — NURSING NOTE
"Chief Complaint   Patient presents with     Foot Pain       Initial /72  Pulse 75  Temp 98.4  F (36.9  C) (Oral)  Ht 5' 6\" (1.676 m)  Wt 138 lb (62.6 kg)  LMP 03/20/1995  SpO2 98%  BMI 22.27 kg/m2 Estimated body mass index is 22.27 kg/(m^2) as calculated from the following:    Height as of this encounter: 5' 6\" (1.676 m).    Weight as of this encounter: 138 lb (62.6 kg).  Medication Reconciliation: complete   Ernestine Jaffe CMA      "

## 2017-05-29 ENCOUNTER — OFFICE VISIT (OUTPATIENT)
Dept: URGENT CARE | Facility: URGENT CARE | Age: 72
End: 2017-05-29
Payer: COMMERCIAL

## 2017-05-29 VITALS
WEIGHT: 136.6 LBS | TEMPERATURE: 98.3 F | OXYGEN SATURATION: 99 % | SYSTOLIC BLOOD PRESSURE: 122 MMHG | BODY MASS INDEX: 22.05 KG/M2 | DIASTOLIC BLOOD PRESSURE: 76 MMHG | HEART RATE: 70 BPM

## 2017-05-29 DIAGNOSIS — J98.01 ACUTE BRONCHOSPASM: ICD-10-CM

## 2017-05-29 DIAGNOSIS — R07.89 CHEST TIGHTNESS: ICD-10-CM

## 2017-05-29 DIAGNOSIS — J20.9 ACUTE BRONCHITIS WITH SYMPTOMS > 10 DAYS: Primary | ICD-10-CM

## 2017-05-29 PROCEDURE — 99213 OFFICE O/P EST LOW 20 MIN: CPT | Mod: 25 | Performed by: PHYSICIAN ASSISTANT

## 2017-05-29 PROCEDURE — 94640 AIRWAY INHALATION TREATMENT: CPT | Performed by: PHYSICIAN ASSISTANT

## 2017-05-29 RX ORDER — PREDNISONE 20 MG/1
20 TABLET ORAL 2 TIMES DAILY
Qty: 10 TABLET | Refills: 0 | Status: SHIPPED | OUTPATIENT
Start: 2017-05-29 | End: 2017-09-12

## 2017-05-29 RX ORDER — LEVALBUTEROL INHALATION SOLUTION 0.63 MG/3ML
SOLUTION RESPIRATORY (INHALATION)
Qty: 3 ML | Status: SHIPPED
Start: 2017-05-29 | End: 2017-09-12

## 2017-05-29 RX ORDER — AZITHROMYCIN 250 MG/1
TABLET, FILM COATED ORAL
Qty: 6 TABLET | Refills: 0 | Status: SHIPPED | OUTPATIENT
Start: 2017-05-29 | End: 2017-07-11

## 2017-05-29 RX ORDER — ALBUTEROL SULFATE 90 UG/1
2 AEROSOL, METERED RESPIRATORY (INHALATION) EVERY 6 HOURS
Qty: 1 INHALER | Refills: 0 | Status: SHIPPED | OUTPATIENT
Start: 2017-05-29 | End: 2017-09-12

## 2017-05-29 NOTE — MR AVS SNAPSHOT
After Visit Summary   5/29/2017    Romy Schwartz    MRN: [id number]           Patient Information     Date Of Birth          1945        Visit Information        Provider Department      5/29/2017 11:40 AM Wm Pagan PA-C [facility]        Today's Diagnoses     Acute bronchitis with symptoms > 10 days    -  1    Chest tightness        Acute bronchospasm           Follow-ups after your visit        Who to contact     If you have questions or need follow up information about today's clinic visit or your schedule please contact [facility] directly at [phone].  Normal or non-critical lab and imaging results will be communicated to you by net[employer], letter or phone within 4 business days after the clinic has received the results. If you do not hear from us within 7 days, please contact the clinic through netZentryhart or phone. If you have a critical or abnormal lab result, we will notify you by phone as soon as possible.  Submit refill requests through [employer] or call your pharmacy and they will forward the refill request to us. Please allow 3 business days for your refill to be completed.          Additional Information About Your Visit        MyChart Information     [employer] gives you secure access to your electronic health record. If you see a primary care provider, you can also send messages to your care team and make appointments. If you have questions, please call your primary care clinic.  If you do not have a primary care provider, please call [phone] and they will assist you.        Care EveryWhere ID     This is your Care EveryWhere ID. This could be used by other organizations to access your [facility] medical records  [id number]        Your Vitals Were     Pulse Temperature Last Period Pulse Oximetry BMI (Body Mass Index)       70 98.3  F (36.8  C) (Oral) 03/20/1995 99% 22.05 kg/m2        Blood Pressure from Last 3  Encounters:   05/29/17 122/76   05/08/17 120/72   03/06/17 126/70    Weight from Last 3 Encounters:   05/29/17 136 lb 9.6 oz (62 kg)   05/08/17 138 lb (62.6 kg)   03/06/17 137 lb (62.1 kg)              We Performed the Following     INHALATION/NEBULIZER TREATMENT, INITIAL     INHALATION/NEBULIZER TREATMENT, INITIAL     LEVALBUTEROL UNIT DOSE, 0.5 MG          Today's Medication Changes          These changes are accurate as of: 5/29/17 11:59 PM.  If you have any questions, ask your nurse or doctor.               Start taking these medicines.        Dose/Directions    albuterol 108 (90 BASE) MCG/ACT Inhaler   Commonly known as:  PROVENTIL HFA   Used for:  Acute bronchitis with symptoms > 10 days, Chest tightness, Acute bronchospasm   Started by:  Wm Pagan PA-C        Dose:  2 puff   Inhale 2 puffs into the lungs every 6 hours   Quantity:  1 Inhaler   Refills:  0       azithromycin 250 MG tablet   Commonly known as:  ZITHROMAX   Used for:  Acute bronchitis with symptoms > 10 days   Started by:  Wm Pagan PA-C        2 tabs po qd day 1, then 1 tab po qd days 2-5   Quantity:  6 tablet   Refills:  0       levalbuterol 0.63 MG/3ML neb solution   Commonly known as:  XOPENEX   Used for:  Acute bronchitis with symptoms > 10 days, Chest tightness, Acute bronchospasm   Started by:  Wm Pagan PA-C        1 vial in nebulizer   Quantity:  3 mL   Refills:  0       predniSONE 20 MG tablet   Commonly known as:  DELTASONE   Used for:  Acute bronchospasm, Chest tightness   Started by:  Wm Pagan PA-C        Dose:  20 mg   Take 1 tablet (20 mg) by mouth 2 times daily   Quantity:  10 tablet   Refills:  0            Where to get your medicines      These medications were sent to [id number] - [place], [place] - [address] AT [facility] [address]  [address], [facility] [address]     Phone:  [phone]     albuterol 108 (90 BASE) MCG/ACT Inhaler     azithromycin 250 MG tablet    predniSONE 20 MG tablet         Some of these will need a paper prescription and others can be bought over the counter.  Ask your nurse if you have questions.     You don't need a prescription for these medications     levalbuterol 0.63 MG/3ML neb solution                Primary Care Provider Office Phone # Fax #    Mick Mendenhall [phone]       [facility] [address]  [facility] [address]        Thank you!     Thank you for choosing [facility]  for your care. Our goal is always to provide you with excellent care. Hearing back from our patients is one way we can continue to improve our services. Please take a few minutes to complete the written survey that you may receive in the mail after your visit with us. Thank you!             Your Updated Medication List - Protect others around you: Learn how to safely use, store and throw away your medicines at www.disposemymeds.org.          This list is accurate as of: 5/29/17 11:59 PM.  Always use your most recent med list.                   Brand Name Dispense Instructions for use    albuterol 108 (90 BASE) MCG/ACT Inhaler    PROVENTIL HFA    1 Inhaler    Inhale 2 puffs into the lungs every 6 hours       ascorbic acid 1000 MG Tabs tablet      1 TABLET DAILY       aspirin 81 MG tablet      Take 1 tablet by mouth daily.       azithromycin 250 MG tablet    ZITHROMAX    6 tablet    2 tabs po qd day 1, then 1 tab po qd days 2-5       BIOTIN 5000 PO          calcium carbonate 500 MG tablet    OS-GERALDINE 500 mg [place]. [place]     Take 500 mg by mouth 2 times daily       cholecalciferol 1000 UNITS capsule    vitamin  -D     Take 1 capsule by mouth daily.       levalbuterol 0.63 MG/3ML neb solution    XOPENEX    3 mL    1 vial in nebulizer       Multiple vitamin Tabs      daily       order for DME     1 Device    Equipment being ordered: right flat sole shoe       predniSONE 20 MG tablet     DELTASONE    10 tablet    Take 1 tablet (20 mg) by mouth 2 times daily

## 2017-05-29 NOTE — NURSING NOTE
"Chief Complaint   Patient presents with     Cough     coughing, heavy chest 9x days        Initial /76 (BP Location: Left arm, Patient Position: Chair, Cuff Size: Adult Regular)  Pulse 70  Temp 98.3  F (36.8  C) (Oral)  Wt 136 lb 9.6 oz (62 kg)  LMP 03/20/1995  SpO2 99%  BMI 22.05 kg/m2 Estimated body mass index is 22.05 kg/(m^2) as calculated from the following:    Height as of 5/8/17: 5' 6\" (1.676 m).    Weight as of this encounter: 136 lb 9.6 oz (62 kg).  Medication Reconciliation: complete    "

## 2017-06-01 NOTE — PROGRESS NOTES
SUBJECTIVE:   Romy Schwartz is a 71 year old female presenting with a chief complaint of chest congestion, productive cough and wheezing.  Onset of symptoms was 9 day(s) ago.  Course of illness is worsening.    Severity moderate  Current and Associated symptoms: chest congestion, coughing, wheezing  Treatment measures tried include OTC meds.  Predisposing factors include recent illness.    Past Medical History:   Diagnosis Date     Arthritis      Normal delivery 1966    no complications with pregnancy or delivery     Urinary incontinence         Allergies   Allergen Reactions     No Known Drug Allergies          Social History   Substance Use Topics     Smoking status: Never Smoker     Smokeless tobacco: Never Used     Alcohol use Yes      Comment: occasional       ROS:  CONSTITUTIONAL:NEGATIVE for fever, chills, change in weight  INTEGUMENTARY/SKIN: NEGATIVE for worrisome rashes, moles or lesions  ENT/MOUTH: POSITIVE for nasal congestion, sinus drainage  RESP:POSITIVE for cough-productive and wheezing  CV: NEGATIVE for chest pain, palpitations or peripheral edema  GI: NEGATIVE for nausea, abdominal pain, heartburn, or change in bowel habits  MUSCULOSKELETAL: NEGATIVE for significant arthralgias or myalgia  NEURO: NEGATIVE for weakness, dizziness or paresthesias    OBJECTIVE  :/76 (BP Location: Left arm, Patient Position: Chair, Cuff Size: Adult Regular)  Pulse 70  Temp 98.3  F (36.8  C) (Oral)  Wt 136 lb 9.6 oz (62 kg)  LMP 03/20/1995  SpO2 99%  BMI 22.05 kg/m2  GENERAL APPEARANCE: healthy, alert and no distress  EYES: EOMI,  PERRL, conjunctiva clear  HENT: TM's normal bilaterally, nasal turbinates erythematous, swollen and rhinorrhea   NECK: supple, nontender, no lymphadenopathy  RESP: expiratory wheezes, productive cough  CV: regular rates and rhythm, normal S1 S2, no murmur noted  NEURO: Normal strength and tone, sensory exam grossly normal,  normal speech and mentation  SKIN: no suspicious  "lesions or rashes    xopenex neb treatment given in clinic    Chest xray Negative for acute findings, read by Wm BAL at time of visit.    ASSESSMENT/PLAN:\"      ICD-10-CM    1. Acute bronchitis with symptoms > 10 days J20.9 levalbuterol (XOPENEX) 0.63 MG/3ML neb solution     albuterol (PROVENTIL HFA) 108 (90 BASE) MCG/ACT Inhaler     azithromycin (ZITHROMAX) 250 MG tablet     LEVALBUTEROL UNIT DOSE, 0.5 MG     INHALATION/NEBULIZER TREATMENT, INITIAL   2. Chest tightness R07.89 levalbuterol (XOPENEX) 0.63 MG/3ML neb solution     INHALATION/NEBULIZER TREATMENT, INITIAL     albuterol (PROVENTIL HFA) 108 (90 BASE) MCG/ACT Inhaler     predniSONE (DELTASONE) 20 MG tablet     LEVALBUTEROL UNIT DOSE, 0.5 MG     INHALATION/NEBULIZER TREATMENT, INITIAL   3. Acute bronchospasm J98.01 levalbuterol (XOPENEX) 0.63 MG/3ML neb solution     INHALATION/NEBULIZER TREATMENT, INITIAL     albuterol (PROVENTIL HFA) 108 (90 BASE) MCG/ACT Inhaler     predniSONE (DELTASONE) 20 MG tablet     LEVALBUTEROL UNIT DOSE, 0.5 MG     INHALATION/NEBULIZER TREATMENT, INITIAL       Continue albuterol  Follow up with PCP as needed  "

## 2017-06-26 ENCOUNTER — TELEPHONE (OUTPATIENT)
Dept: INTERNAL MEDICINE | Facility: CLINIC | Age: 72
End: 2017-06-26

## 2017-06-26 DIAGNOSIS — Z13.6 CARDIOVASCULAR SCREENING; LDL GOAL LESS THAN 160: Primary | ICD-10-CM

## 2017-06-26 NOTE — TELEPHONE ENCOUNTER
Reason for Call: Request for an order or referral:    Order or referral being requested: labs    Date needed: as soon as possible    Has the patient been seen by the PCP for this problem? NO    Additional comments: pt had labs done in dec and cholesterol was a little high and wanted another lab order put in to see if cholesterol came down pt has working on getting it down. Please call pt when done.    Phone number Patient can be reached at:  Home number on file [phone] (home)    Best Time:  anytime    Can we leave a detailed message on this number?  YES    Call taken on 6/26/2017 at 1:34 PM by ANTIONE SINGLETON

## 2017-07-11 DIAGNOSIS — Z13.6 CARDIOVASCULAR SCREENING; LDL GOAL LESS THAN 160: ICD-10-CM

## 2017-07-11 LAB
CHOLEST SERPL-MCNC: 217 MG/DL
HDLC SERPL-MCNC: 85 MG/DL
LDLC SERPL CALC-MCNC: 113 MG/DL
NONHDLC SERPL-MCNC: 132 MG/DL
TRIGL SERPL-MCNC: 93 MG/DL

## 2017-07-11 PROCEDURE — 80061 LIPID PANEL: CPT | Performed by: INTERNAL MEDICINE

## 2017-07-11 PROCEDURE — 36415 COLL VENOUS BLD VENIPUNCTURE: CPT | Performed by: INTERNAL MEDICINE

## 2017-09-12 ENCOUNTER — OFFICE VISIT (OUTPATIENT)
Dept: INTERNAL MEDICINE | Facility: CLINIC | Age: 72
End: 2017-09-12
Payer: COMMERCIAL

## 2017-09-12 VITALS
DIASTOLIC BLOOD PRESSURE: 78 MMHG | TEMPERATURE: 98 F | HEIGHT: 66 IN | HEART RATE: 70 BPM | WEIGHT: 138.1 LBS | BODY MASS INDEX: 22.19 KG/M2 | OXYGEN SATURATION: 98 % | SYSTOLIC BLOOD PRESSURE: 118 MMHG

## 2017-09-12 DIAGNOSIS — G44.219 EPISODIC TENSION-TYPE HEADACHE, NOT INTRACTABLE: Primary | ICD-10-CM

## 2017-09-12 PROCEDURE — 99214 OFFICE O/P EST MOD 30 MIN: CPT | Performed by: INTERNAL MEDICINE

## 2017-09-12 NOTE — MR AVS SNAPSHOT
"              After Visit Summary   9/12/2017    Romy Schwartz    MRN: [id number]           Patient Information     Date Of Birth          1945        Visit Information        Provider Department      9/12/2017 7:40 AM Mick Mendenhall MD [facility]        Today's Diagnoses     Episodic tension-type headache, not intractable    -  1       Follow-ups after your visit        Follow-up notes from your care team     Return if symptoms worsen or fail to improve.      Who to contact     If you have questions or need follow up information about today's clinic visit or your schedule please contact [facility] directly at [phone].  Normal or non-critical lab and imaging results will be communicated to you by MyChart, letter or phone within 4 business days after the clinic has received the results. If you do not hear from us within 7 days, please contact the clinic through [employer] or phone. If you have a critical or abnormal lab result, we will notify you by phone as soon as possible.  Submit refill requests through [employer] or call your pharmacy and they will forward the refill request to us. Please allow 3 business days for your refill to be completed.          Additional Information About Your Visit        MyChart Information     [employer] gives you secure access to your electronic health record. If you see a primary care provider, you can also send messages to your care team and make appointments. If you have questions, please call your primary care clinic.  If you do not have a primary care provider, please call [phone] and they will assist you.        Care EveryWhere ID     This is your Care EveryWhere ID. This could be used by other organizations to access your [facility] medical records  [id number]        Your Vitals Were     Pulse Temperature Height Last Period Pulse Oximetry BMI (Body Mass Index)    70 98  F (36.7  C) (Oral) 5' 6\" (1.676 m) 03/20/1995 " 98% 22.29 kg/m2       Blood Pressure from Last 3 Encounters:   09/12/17 118/78   05/29/17 122/76   05/08/17 120/72    Weight from Last 3 Encounters:   09/12/17 138 lb 1.6 oz (62.6 kg)   05/29/17 136 lb 9.6 oz (62 kg)   05/08/17 138 lb (62.6 kg)              Today, you had the following     No orders found for display       Primary Care Provider Office Phone # Fax #    Mick Mendenhall [phone]       [address] [facility] [address]        Equal Access to Services     [facility] : Hadii colleen epstein hadasho Sobeverley, waaxda luqadaha, qaybta kaalmada adecathrynyada, molly diallo. So [facility] [phone].    ATENCIÓN: Si habla español, tiene a han disposición servicios gratuitos de asistencia lingüística. [facility] [phone].    We comply with applicable federal civil rights laws and [place] laws. We do not discriminate on the basis of race, color, national origin, age, disability sex, sexual orientation or gender identity.            Thank you!     Thank you for choosing [facility]  for your care. Our goal is always to provide you with excellent care. Hearing back from our patients is one way we can continue to improve our services. Please take a few minutes to complete the written survey that you may receive in the mail after your visit with us. Thank you!             Your Updated Medication List - Protect others around you: Learn how to safely use, store and throw away your medicines at www.disposemymeds.org.          This list is accurate as of: 9/12/17  8:18 AM.  Always use your most recent med list.                   Brand Name Dispense Instructions for use Diagnosis    aspirin 81 MG tablet      Take 1 tablet by mouth daily.        calcium carbonate 1250 MG tablet    OS-GERALDINE 500 mg [place]. [place]     Take 500 mg by mouth 2 times daily        cholecalciferol 1000 UNITS capsule    vitamin  -D     Take 1 capsule by mouth daily.        Multiple vitamin  Tabs      daily

## 2017-09-12 NOTE — NURSING NOTE
"Chief Complaint   Patient presents with     Headache       Initial /78  Pulse 70  Temp 98  F (36.7  C) (Oral)  Ht 5' 6\" (1.676 m)  Wt 138 lb 1.6 oz (62.6 kg)  LMP 03/20/1995  SpO2 98%  BMI 22.29 kg/m2 Estimated body mass index is 22.29 kg/(m^2) as calculated from the following:    Height as of this encounter: 5' 6\" (1.676 m).    Weight as of this encounter: 138 lb 1.6 oz (62.6 kg).  Medication Reconciliation: complete   Ernestine Jaffe, JOSE A      "

## 2017-09-12 NOTE — PROGRESS NOTES
SUBJECTIVE:   Romy Schwartz is a 71 year old female who presents to clinic today for the following health issues:    Headaches      Duration: 5 days     Description  Location: bilateral in the occipital area   Character: dull pain  Frequency:  Daily x 5 days  Duration:  Resolved with ibuprofen     Intensity:  moderate    Accompanying signs and symptoms: Neck and shoulder pain x 5 days- concerned about arthritis in neck    Precipitating or Alleviating factors:  Nausea/vomiting: nausea   Dizziness: no  Weakness or numbness: no  Visual changes: none  Fever: no   Sinus or URI symptoms no     History: Patient   Head trauma: no  Family history of migraines: no  Previous tests for headaches: no  Neurologist evaluations: no  Able to do daily activities when headache present: no- has been laying down for 2 days   Wake with headaches: no  Daily pain medication use: no  Any changes in: None     Precipitating or Alleviating factors (light/sound/sleep/caffeine): None     Therapies tried and outcome: Ibuprofen (Advil, Motrin)    Outcome - usually effective  Frequent/daily pain medication use: no      Problem list and histories reviewed & adjusted, as indicated.  Additional history: as documented    Patient Active Problem List   Diagnosis     Lumbago     Osteopenia     CARDIOVASCULAR SCREENING; LDL GOAL LESS THAN 160     Urgency-frequency syndrome     ACP (advance care planning)     Fracture closed, humerus     Anemia due to blood loss, acute     Anxiety     Past Surgical History:   Procedure Laterality Date     C NONSPECIFIC PROCEDURE  1974    BTL     C NONSPECIFIC PROCEDURE  12/99     C SPINE FUSION,ANTER,3 SGMTS  2007    cervical spine fusion C4-5, C5-6, C6-7     HC COLONOSCOPY THRU STOMA, DIAGNOSTIC  2/24/05    normal colonoscopy     HC TOOTH EXTRACTION W/FORCEP  1964    4 wisdom teeth removed     OPEN REDUCTION INTERNAL FIXATION HUMERUS DISTAL  8/5/2014    Procedure: OPEN REDUCTION INTERNAL FIXATION HUMERUS DISTAL;   Surgeon: Yusuf Hedrick MD;  Location: [facility] OR       Social History   Substance Use Topics     Smoking status: Never Smoker     Smokeless tobacco: Never Used     Alcohol use Yes      Comment: occasional     Family History   Problem Relation Age of Onset     HEART DISEASE Father       age 78 also bypass surg     Lipids Mother      alive b. 1924     CANCER Mother      Non Hodgkins     CANCER Brother      d. age 47 myelodysplasia from chemotherapy nonhodgkins lymphoma     Family History Negative Brother      alive b.  1934     Family History Negative Brother      alive age b. 1940     Cancer - colorectal Maternal Grandmother       colon cancer         Current Outpatient Prescriptions   Medication Sig Dispense Refill     BIOTIN 5000 PO        calcium carbonate (OS-GERALDINE 500 MG [place]. [place]) 500 MG tablet Take 500 mg by mouth 2 times daily       aspirin 81 MG tablet Take 1 tablet by mouth daily.       cholecalciferol (VITAMIN-D) 1000 UNIT capsule Take 1 capsule by mouth daily.       ASCORBIC ACID 1000 MG PO TABS 1 TABLET DAILY       MULTIPLE VITAMIN PO TABS daily       Allergies   Allergen Reactions     No Known Drug Allergies      BP Readings from Last 3 Encounters:   17 118/78   17 122/76   17 120/72    Wt Readings from Last 3 Encounters:   17 138 lb 1.6 oz (62.6 kg)   17 136 lb 9.6 oz (62 kg)   17 138 lb (62.6 kg)           Reviewed and updated as needed this visit by clinical staffTobacco  Allergies  Med Hx  Surg Hx  Fam Hx  Soc Hx      Reviewed and updated as needed this visit by Provider       ROS:  C: NEGATIVE for fever, chills, change in weight  E/M: NEGATIVE for ear, mouth and throat problems  R: NEGATIVE for significant cough or SOB  CV: NEGATIVE for chest pain, palpitations or peripheral edema  GI: NEGATIVE for nausea, abdominal pain, heartburn, or change in bowel habits  : NEGATIVE for frequency, dysuria, or hematuria  M: NEGATIVE for significant arthralgias or  "myalgia  H: NEGATIVE for bleeding problems  P: NEGATIVE for changes in mood or affect    OBJECTIVE:                                                    /78  Pulse 70  Temp 98  F (36.7  C) (Oral)  Ht 5' 6\" (1.676 m)  Wt 138 lb 1.6 oz (62.6 kg)  LMP 03/20/1995  SpO2 98%  BMI 22.29 kg/m2  Body mass index is 22.29 kg/(m^2).  GENERAL: healthy, alert and no distress  EYES: Eyes grossly normal to inspection, extraocular movements - intact, and PERRL  HENT: ear canals- normal; TMs- normal; Nose- normal; Mouth- no ulcers, no lesions  NECK: no tenderness, no adenopathy, no asymmetry, no masses, no stiffness; thyroid- normal to palpation  RESP: lungs clear to auscultation - no rales, no rhonchi, no wheezes  CV: regular rates and rhythm, normal S1 S2, no S3 or S4 and no murmur, no click or rub -  MS: extremities- no gross deformities noted, no edema  NEURO: no focal changes, no nuchal rigidity,   PSYCH: Alert and oriented times 3; speech- coherent , normal rate and volume; able to articulate logical thoughts, able to abstract reason, no tangential thoughts, no hallucinations or delusions, affect- normal       ASSESSMENT/PLAN:                                                      (G44.219) Episodic tension-type headache, not intractable  (primary encounter diagnosis)  Comment: there are no red flag concerns based on exam nor history.  Noted prior CT imaging 1/2017, do not see further indication for additional imaging.  Appears as tension headache and gets relief with limited NSAID use.  Plan:       See Patient Instructions    Mick Mendenhall MD  [facility]    25 minutes spent with this patient, face to face, discussing treatment options for listed problems above as well as side effects of appropriate medications.  Counseling time extended beyond 50% of the clinic visit.  Medication dosing, treatment plan and follow-up were discussed. Also reviewed need for primary care testing for patient. "

## 2018-01-16 ENCOUNTER — OFFICE VISIT (OUTPATIENT)
Dept: INTERNAL MEDICINE | Facility: CLINIC | Age: 73
End: 2018-01-16
Payer: COMMERCIAL

## 2018-01-16 VITALS
HEIGHT: 66 IN | HEART RATE: 67 BPM | TEMPERATURE: 98.2 F | DIASTOLIC BLOOD PRESSURE: 75 MMHG | OXYGEN SATURATION: 98 % | SYSTOLIC BLOOD PRESSURE: 132 MMHG | WEIGHT: 137.1 LBS | BODY MASS INDEX: 22.03 KG/M2

## 2018-01-16 DIAGNOSIS — M85.80 OSTEOPENIA, UNSPECIFIED LOCATION: ICD-10-CM

## 2018-01-16 DIAGNOSIS — Z12.31 VISIT FOR SCREENING MAMMOGRAM: ICD-10-CM

## 2018-01-16 DIAGNOSIS — Z13.6 CARDIOVASCULAR SCREENING; LDL GOAL LESS THAN 160: ICD-10-CM

## 2018-01-16 DIAGNOSIS — Z12.11 COLON CANCER SCREENING: ICD-10-CM

## 2018-01-16 DIAGNOSIS — Z78.0 ASYMPTOMATIC POSTMENOPAUSAL STATUS: ICD-10-CM

## 2018-01-16 DIAGNOSIS — Z00.00 MEDICARE ANNUAL WELLNESS VISIT, SUBSEQUENT: Primary | ICD-10-CM

## 2018-01-16 DIAGNOSIS — Z71.89 ACP (ADVANCE CARE PLANNING): ICD-10-CM

## 2018-01-16 LAB
ALBUMIN SERPL-MCNC: 4.1 G/DL (ref 3.4–5)
ALP SERPL-CCNC: 71 U/L (ref 40–150)
ALT SERPL W P-5'-P-CCNC: 35 U/L (ref 0–50)
ANION GAP SERPL CALCULATED.3IONS-SCNC: 5 MMOL/L (ref 3–14)
AST SERPL W P-5'-P-CCNC: 35 U/L (ref 0–45)
BILIRUB SERPL-MCNC: 0.4 MG/DL (ref 0.2–1.3)
BUN SERPL-MCNC: 11 MG/DL (ref 7–30)
CALCIUM SERPL-MCNC: 9 MG/DL (ref 8.5–10.1)
CHLORIDE SERPL-SCNC: 104 MMOL/L (ref 94–109)
CHOLEST SERPL-MCNC: 205 MG/DL
CO2 SERPL-SCNC: 30 MMOL/L (ref 20–32)
CREAT SERPL-MCNC: 0.58 MG/DL (ref 0.52–1.04)
GFR SERPL CREATININE-BSD FRML MDRD: >90 ML/MIN/1.7M2
GLUCOSE SERPL-MCNC: 86 MG/DL (ref 70–99)
HDLC SERPL-MCNC: 91 MG/DL
HGB BLD-MCNC: 13 G/DL (ref 11.7–15.7)
LDLC SERPL CALC-MCNC: 100 MG/DL
NONHDLC SERPL-MCNC: 114 MG/DL
POTASSIUM SERPL-SCNC: 4.4 MMOL/L (ref 3.4–5.3)
PROT SERPL-MCNC: 7.7 G/DL (ref 6.8–8.8)
SODIUM SERPL-SCNC: 139 MMOL/L (ref 133–144)
TRIGL SERPL-MCNC: 70 MG/DL

## 2018-01-16 PROCEDURE — 36415 COLL VENOUS BLD VENIPUNCTURE: CPT | Performed by: INTERNAL MEDICINE

## 2018-01-16 PROCEDURE — 80053 COMPREHEN METABOLIC PANEL: CPT | Performed by: INTERNAL MEDICINE

## 2018-01-16 PROCEDURE — 80061 LIPID PANEL: CPT | Performed by: INTERNAL MEDICINE

## 2018-01-16 PROCEDURE — 85018 HEMOGLOBIN: CPT | Performed by: INTERNAL MEDICINE

## 2018-01-16 PROCEDURE — 99397 PER PM REEVAL EST PAT 65+ YR: CPT | Performed by: INTERNAL MEDICINE

## 2018-01-16 ASSESSMENT — ACTIVITIES OF DAILY LIVING (ADL)
I_NEED_ASSISTANCE_FOR_THE_FOLLOWING_DAILY_ACTIVITIES:: NO ASSISTANCE IS NEEDED
CURRENT_FUNCTION: NO ASSISTANCE NEEDED

## 2018-01-16 NOTE — MR AVS SNAPSHOT
After Visit Summary   1/16/2018    Romy Schwartz    MRN: [id number]           Patient Information     Date Of Birth          1945        Visit Information        Provider Department      1/16/2018 8:00 AM Mick Mendenhall MD [facility]        Today's Diagnoses     Medicare annual wellness visit, subsequent    -  1    CARDIOVASCULAR SCREENING; LDL GOAL LESS THAN 160        Visit for screening mammogram        Asymptomatic postmenopausal status        Colon cancer screening        Osteopenia, unspecified location        ACP (advance care planning)           Follow-ups after your visit        Follow-up notes from your care team     Return if symptoms worsen or fail to improve.      Future tests that were ordered for you today     Open Future Orders        Priority Expected Expires Ordered    *MA Screening Digital Bilateral Routine  1/16/2019 1/16/2018    DX Hip/Pelvis/Spine Routine  1/16/2019 1/16/2018    Fecal colorectal cancer screen (FIT) Routine 2/6/2018 4/10/2018 1/16/2018            Who to contact     If you have questions or need follow up information about today's clinic visit or your schedule please contact [facility] directly at [phone].  Normal or non-critical lab and imaging results will be communicated to you by YUPIQhart, letter or phone within 4 business days after the clinic has received the results. If you do not hear from us within 7 days, please contact the clinic through YUPIQhart or phone. If you have a critical or abnormal lab result, we will notify you by phone as soon as possible.  Submit refill requests through [employer]oS[employer] or call your pharmacy and they will forward the refill request to us. Please allow 3 business days for your refill to be completed.          Additional Information About Your Visit        MyChart Information     CloSys gives you secure access to your electronic health record. If you see a primary care  "provider, you can also send messages to your care team and make appointments. If you have questions, please call your primary care clinic.  If you do not have a primary care provider, please call [phone] and they will assist you.        Care EveryWhere ID     This is your Care EveryWhere ID. This could be used by other organizations to access your [facility] medical records  [id number]        Your Vitals Were     Pulse Temperature Height Last Period Pulse Oximetry Breastfeeding?    67 98.2  F (36.8  C) 5' 6\" (1.676 m) 03/20/1995 98% No    BMI (Body Mass Index)                   22.13 kg/m2            Blood Pressure from Last 3 Encounters:   01/16/18 132/75   09/12/17 118/78   05/29/17 122/76    Weight from Last 3 Encounters:   01/16/18 137 lb 1.6 oz (62.2 kg)   09/12/17 138 lb 1.6 oz (62.6 kg)   05/29/17 136 lb 9.6 oz (62 kg)              We Performed the Following     Comprehensive metabolic panel     Hemoglobin     Lipid Profile     PAF COMPLETED        Primary Care Provider Office Phone # Fax #    Mick Mendenhall [phone]       [address] [facility] [address]        Equal Access to Services     DEREK FORTUNE : Hadii aad ku hadasho Soomaali, waaxda luqadaha, qaybta kaalmada adeegyada, waxay idiin haysavagen monalisa vila lasimeon diallo. So [facility] [phone].    ATENCIÓN: Si habla español, tiene a han disposición servicios gratuitos de asistencia lingüística. Llame al [phone].    We comply with applicable federal civil rights laws and [place] laws. We do not discriminate on the basis of race, color, national origin, age, disability, sex, sexual orientation, or gender identity.            Thank you!     Thank you for choosing [facility][1]  for your care. Our goal is always to provide you with excellent care. Hearing back from our patients is one way we can continue to improve our services. Please take a few minutes to complete the written survey that you may " receive in the mail after your visit with us. Thank you!             Your Updated Medication List - Protect others around you: Learn how to safely use, store and throw away your medicines at www.disposemymeds.org.          This list is accurate as of: 1/16/18  8:12 AM.  Always use your most recent med list.                   Brand Name Dispense Instructions for use Diagnosis    aspirin 81 MG tablet      Take 1 tablet by mouth daily.        calcium carbonate 1250 MG tablet    OS-GERALDINE 500 mg [place]. [place]     Take 500 mg by mouth 2 times daily        cholecalciferol 1000 UNITS capsule    vitamin  -D     Take 1 capsule by mouth daily.        Multiple vitamin Tabs      daily

## 2018-01-16 NOTE — LETTER
[facility]  [address] [place] [address]  [phone]      1/16/2018       Romy Schwartz  [address] [facility] [address]        Dear Romy,    I am pleased to inform you that your routine blood work including your hemoglobin, sodium, potassium, calcium, kidney and liver function tests are all normal.    Your cholesterol looks good and I would not change anything at this point but would repeat your labs in 12 months.    Sincerely,      Mick Mendenhall MD  Internal Medicine

## 2018-01-16 NOTE — PROGRESS NOTES
SUBJECTIVE:      Romy Schwartz is a 72 year old female who presents for Preventive Visit.     Annual Exam:  Getting at least 3 servings of Calcium per day:: Yes  Bi-annual eye exam:: Yes  Dental care twice a year:: Yes  Sleep apnea or symptoms of sleep apnea:: None  Diet:: Regular (no restrictions)  Taking medications regularly:: Not Applicable  Medication side effects:: Not applicable  Additional concerns today:: No  Activities of Daily Living: no assistance needed  Home safety: no safety concerns identified  Hearing Impairment:: no hearing concerns  PHQ-2 Score: 0    Are you in the first 12 months of your Medicare Part B coverage?  No     Healthy Habits:    Do you get at least three servings of calcium containing foods daily (dairy, green leafy vegetables, etc.)? yes    Amount of exercise or daily activities, outside of work: 7 day(s) per week    Problems taking medications regularly not applicable    Medication side effects: No    Have you had an eye exam in the past two years? yes    Do you see a dentist twice per year? yes    Do you have sleep apnea, excessive snoring or daytime drowsiness?no     Other concerns:     Musculoskeletal problem : x 1 year - Left calve - occasionally, cramping before going to sleep   Issues were discussed with patient.            All Histories reviewed and updated in EPIC as appropriate.         Social History   Substance Use Topics     Smoking status: Never Smoker      Smokeless tobacco: Never Used     Alcohol Use: Yes         Comment: occasional         The patient does not drink >3 drinks per day nor >7 drinks per week.     Today's PHQ-2 Score:   PHQ-2 ( 1999 Pf[employer]) 1/10/2017 12/15/2015   Q1: Little interest or pleasure in doing things 0 0   Q2: Feeling down, depressed or hopeless 0 0   PHQ-2 Score 0 0         Do you feel safe in your environment - Yes     Do you have a Health Care Directive?: Yes: Advance Directive has been received and scanned.     Current providers sharing  in care for this patient include:   Patient Care Team:  Mick Mendenhall MD as PCP - General (Internal Medicine)       Hearing impairment: No    Ability to successfully perform activities of daily living: Yes, no assistance needed     Fall risk:  Fall Risk Assessment not completed.    Home safety:  none identified        The following health maintenance items are reviewed in Epic and correct as of today:       Health Maintenance   Topic Date Due     FALL RISK ASSESSMENT  12/15/2016     TETANUS Q10 YR  07/05/2017     INFLUENZA VACCINE (SYSTEM ASSIGNED)  09/01/2017     ADVANCE DIRECTIVE PLANNING Q5 YRS (NO INBASKET)  11/07/2017     MAMMO SCREEN Q2 YR (SYSTEM ASSIGNED)  12/30/2017     LIPID SCREEN Q5 YR FEMALE (SYSTEM ASSIGNED)  12/15/2020     COLONOSCOPY Q10 YR INBASKET MESSAGE  05/06/2025     DEXA SCAN SCREENING (SYSTEM ASSIGNED)  Completed     PNEUMOCOCCAL  Completed                 Immunization History   Administered Date(s) Administered     Influenza (High Dose) 3 valent vaccine 10/17/2014, 12/15/2015, 10/20/2016     Influenza (IIV3) 11/02/1999, 11/20/2010, 11/13/2012     Influenza Vaccine IM 3yrs+ 4 Valent IIV4 11/12/2013     Pneumococcal (PCV 13) 12/15/2015     Pneumococcal 23 valent 06/08/2010     TD (ADULT, 7+) 07/05/2007     Zoster vaccine, live 05/16/2012         ROS:  C: NEGATIVE for fever, chills, change in weight  E/M: NEGATIVE for ear, mouth and throat problems  R: NEGATIVE for significant cough or SOB  CV: NEGATIVE for chest pain, palpitations or peripheral edema  GI: NEGATIVE for nausea, abdominal pain, heartburn, or change in bowel habits  : NEGATIVE for frequency, dysuria, or hematuria  M: NEGATIVE for significant arthralgias or myalgia  N: NEGATIVE for weakness, dizziness or paresthesias  H: NEGATIVE for bleeding problems  P: NEGATIVE for changes in mood or affect     OBJECTIVE:      /75 (BP Location: Left arm, Patient Position: Chair, Cuff Size: Adult Regular)  Pulse 67  Temp 98.2  F  "(36.8  C)  Ht 5' 6\" (1.676 m)  Wt 137 lb 1.6 oz (62.2 kg)  LMP 03/20/1995  SpO2 98%  Breastfeeding? No  BMI 22.13 kg/m2    EXAM:   GENERAL: healthy, alert and no distress  EYES: Eyes grossly normal to inspection, PERRL and conjunctivae and sclerae normal  HENT: ear canals and TM's normal, nose and mouth without ulcers or lesions  NECK: no adenopathy, no asymmetry, masses, or scars and thyroid normal to palpation  RESP: lungs clear to auscultation - no rales, rhonchi or wheezes  CV: regular rate and rhythm, normal S1 S2, no S3 or S4, no murmur, click or rub, no peripheral edema and peripheral pulses strong  ABDOMEN: soft, nontender, no hepatosplenomegaly, no masses and bowel sounds normal  MS: no gross changes noted  usculoskeletal defects noted, no edema  NEURO: No focal changes  PSYCH: mentation appears normal, affect normal/bright     ASSESSMENT/PLAN:     (Z00.00) Medicare annual wellness visit, subsequent  (primary encounter diagnosis)  Comment: Patient is doing well with no acute changes.  Plan: Hemoglobin, Comprehensive metabolic panel,         Lipid Profile            (Z13.6) CARDIOVASCULAR SCREENING; LDL GOAL LESS THAN 160  Comment: Labs ordered as fasting discussed routine screening cholesterol done last year also  Plan: Lipid Profile            (Z12.31) Visit for screening mammogram  Comment: Ordered as screening annually  Plan: *MA Screening Digital Bilateral            (Z78.0) Asymptomatic postmenopausal status  Comment: As needed for bone density scan every 3 years  Plan: DX Hip/Pelvis/Spine            (Z12.11) Colon cancer screening  Comment: Reviewed need for colonoscopy on an age related basis  Plan: Fecal colorectal cancer screen (FIT)            (M85.80) Osteopenia, unspecified location  Comment: Bone density scan ordered  Plan: DX Hip/Pelvis/Spine            THE MEDICATION LIST HAS BEEN FULLY RECONCILED BY THE M.D. AND THE NURSING STAFF.      "

## 2018-01-16 NOTE — NURSING NOTE
"Chief Complaint   Patient presents with     Physical     Currently, fasting      Musculoskeletal Problem       Initial /75 (BP Location: Left arm, Patient Position: Chair, Cuff Size: Adult Regular)  Pulse 67  Temp 98.2  F (36.8  C)  Ht 5' 6\" (1.676 m)  Wt 137 lb 1.6 oz (62.2 kg)  LMP 03/20/1995  SpO2 98%  Breastfeeding? No  BMI 22.13 kg/m2 Estimated body mass index is 22.13 kg/(m^2) as calculated from the following:    Height as of this encounter: 5' 6\" (1.676 m).    Weight as of this encounter: 137 lb 1.6 oz (62.2 kg).  Medication Reconciliation: complete     Misa Blanco MA     "

## 2018-01-16 NOTE — ASSESSMENT & PLAN NOTE
Advance Care Planning 1/16/2018: ACP Review of Chart / Resources Provided:  Reviewed chart for advance care plan.  Romy Schwartz has an up to date advance care plan on file.  Added by Misa Blanco

## 2018-01-24 DIAGNOSIS — Z12.11 COLON CANCER SCREENING: ICD-10-CM

## 2018-01-24 LAB — HEMOCCULT STL QL IA: NEGATIVE

## 2018-01-24 PROCEDURE — 82274 ASSAY TEST FOR BLOOD FECAL: CPT | Performed by: INTERNAL MEDICINE

## 2018-03-21 ENCOUNTER — RADIANT APPOINTMENT (OUTPATIENT)
Dept: MAMMOGRAPHY | Facility: CLINIC | Age: 73
End: 2018-03-21
Attending: INTERNAL MEDICINE
Payer: COMMERCIAL

## 2018-03-21 ENCOUNTER — RADIANT APPOINTMENT (OUTPATIENT)
Dept: BONE DENSITY | Facility: CLINIC | Age: 73
End: 2018-03-21
Attending: INTERNAL MEDICINE
Payer: COMMERCIAL

## 2018-03-21 DIAGNOSIS — Z78.0 ASYMPTOMATIC POSTMENOPAUSAL STATUS: ICD-10-CM

## 2018-03-21 DIAGNOSIS — Z12.31 VISIT FOR SCREENING MAMMOGRAM: ICD-10-CM

## 2018-03-21 DIAGNOSIS — M85.80 OSTEOPENIA, UNSPECIFIED LOCATION: ICD-10-CM

## 2018-03-21 PROCEDURE — 77080 DXA BONE DENSITY AXIAL: CPT | Performed by: INTERNAL MEDICINE

## 2018-03-21 PROCEDURE — 77067 SCR MAMMO BI INCL CAD: CPT | Mod: TC

## 2018-06-11 ENCOUNTER — OFFICE VISIT (OUTPATIENT)
Dept: INTERNAL MEDICINE | Facility: CLINIC | Age: 73
End: 2018-06-11
Payer: COMMERCIAL

## 2018-06-11 VITALS
RESPIRATION RATE: 16 BRPM | SYSTOLIC BLOOD PRESSURE: 126 MMHG | WEIGHT: 132 LBS | HEART RATE: 76 BPM | DIASTOLIC BLOOD PRESSURE: 84 MMHG | BODY MASS INDEX: 21.21 KG/M2 | TEMPERATURE: 98.3 F | HEIGHT: 66 IN

## 2018-06-11 DIAGNOSIS — H60.501 ACUTE OTITIS EXTERNA OF RIGHT EAR, UNSPECIFIED TYPE: Primary | ICD-10-CM

## 2018-06-11 DIAGNOSIS — H61.21 IMPACTED CERUMEN OF RIGHT EAR: ICD-10-CM

## 2018-06-11 PROCEDURE — 99213 OFFICE O/P EST LOW 20 MIN: CPT | Performed by: PHYSICIAN ASSISTANT

## 2018-06-11 RX ORDER — NEOMYCIN SULFATE, POLYMYXIN B SULFATE AND HYDROCORTISONE 10; 3.5; 1 MG/ML; MG/ML; [USP'U]/ML
4 SUSPENSION/ DROPS AURICULAR (OTIC) 3 TIMES DAILY
Qty: 5 ML | Refills: 0 | Status: SHIPPED | OUTPATIENT
Start: 2018-06-11 | End: 2018-06-18

## 2018-06-11 ASSESSMENT — PAIN SCALES - GENERAL: PAINLEVEL: NO PAIN (0)

## 2018-06-11 NOTE — MR AVS SNAPSHOT
"              After Visit Summary   6/11/2018    Romy Schwartz    MRN: [id number]           Patient Information     Date Of Birth          1945        Visit Information        Provider Department      6/11/2018 3:00 PM Kelsey Palomino PA-C [facility]        Today's Diagnoses     Acute otitis externa of right ear, unspecified type    -  1    Impacted cerumen of right ear           Follow-ups after your visit        Who to contact     If you have questions or need follow up information about today's clinic visit or your schedule please contact [facility] directly at [phone].  Normal or non-critical lab and imaging results will be communicated to you by MyChart, letter or phone within 4 business days after the clinic has received the results. If you do not hear from us within 7 days, please contact the clinic through Talenzhart or phone. If you have a critical or abnormal lab result, we will notify you by phone as soon as possible.  Submit refill requests through [employer] or call your pharmacy and they will forward the refill request to us. Please allow 3 business days for your refill to be completed.          Additional Information About Your Visit        MyChart Information     [employer] gives you secure access to your electronic health record. If you see a primary care provider, you can also send messages to your care team and make appointments. If you have questions, please call your primary care clinic.  If you do not have a primary care provider, please call [phone] and they will assist you.        Care EveryWhere ID     This is your Care EveryWhere ID. This could be used by other organizations to access your [facility] medical records  [id number]        Your Vitals Were     Pulse Temperature Respirations Height Last Period BMI (Body Mass Index)    76 98.3  F (36.8  C) (Oral) 16 5' 6\" (1.676 m) 03/20/1995 21.31 kg/m2       Blood Pressure from " Last 3 Encounters:   06/11/18 126/84   01/16/18 132/75   09/12/17 118/78    Weight from Last 3 Encounters:   06/11/18 132 lb (59.9 kg)   01/16/18 137 lb 1.6 oz (62.2 kg)   09/12/17 138 lb 1.6 oz (62.6 kg)              We Performed the Following     HC REMOVAL IMPACTED CERUMEN IRRIGATION/LVG UNILAT          Today's Medication Changes          These changes are accurate as of 6/11/18  3:31 PM.  If you have any questions, ask your nurse or doctor.               Start taking these medicines.        Dose/Directions    neomycin-polymyxin-hydrocortisone 3.5-80091-2 otic suspension   Commonly known as:  CORTISPORIN   Used for:  Acute otitis externa of right ear, unspecified type, Impacted cerumen of right ear   Started by:  Kelsey Palomino PA-C        Dose:  4 drop   Place 4 drops into the right ear 3 times daily for 7 days   Quantity:  5 mL   Refills:  0            Where to get your medicines      These medications were sent to [facility] [address] [place] [address] [address] AT [facility] [address]  [address], [facility] [address]     Phone:  [phone]     neomycin-polymyxin-hydrocortisone 3.5-20082-6 otic suspension                Primary Care Provider Office Phone # Fax #    Mick Mendenhall [phone]       [address] [facility] [address]        Equal Access to Services     [facility] UCLA Medical CenterREMY AH: Hadii aad ku hadasho Soomaali, waaxda luqadaha, qaybta kaalmada adeegyada, molly diallo. So [facility] [phone].    ATENCIÓN: Si habla español, tiene a han disposición servicios gratuitos de asistencia lingüística. Luis lorenzo [phone].    We comply with applicable federal civil rights laws and [place] laws. We do not discriminate on the basis of race, color, national origin, age, disability, sex, sexual orientation, or gender identity.            Thank you!     Thank you for choosing [facility]   for your care. Our goal is always to provide you with excellent care. Hearing back from our patients is one way we can continue to improve our services. Please take a few minutes to complete the written survey that you may receive in the mail after your visit with us. Thank you!             Your Updated Medication List - Protect others around you: Learn how to safely use, store and throw away your medicines at www.disposemymeds.org.          This list is accurate as of 6/11/18  3:31 PM.  Always use your most recent med list.                   Brand Name Dispense Instructions for use Diagnosis    aspirin 81 MG tablet      Take 1 tablet by mouth daily.        calcium carbonate 500 MG tablet    OS-GERALDINE 500 mg [place]. [place]     Take 500 mg by mouth 2 times daily        cholecalciferol 1000 units capsule    vitamin  -D     Take 1 capsule by mouth daily.        Multiple vitamin Tabs      daily        neomycin-polymyxin-hydrocortisone 3.5-45832-8 otic suspension    CORTISPORIN    5 mL    Place 4 drops into the right ear 3 times daily for 7 days    Acute otitis externa of right ear, unspecified type, Impacted cerumen of right ear

## 2018-06-11 NOTE — PROGRESS NOTES
"  SUBJECTIVE:   Romy Schwartz is a 72 year old female who presents to clinic today for the following health issues:      Concern - Ear Pain   Onset: x2 wks     Description:   Pt states she has been having a ache in her right ear.     Intensity: moderate    Progression of Symptoms:  same    Accompanying Signs & Symptoms:  Na     Previous history of similar problem:   No     Precipitating factors:   Worsened by: nothing     Alleviating factors:  Improved by: nothing     Therapies Tried and outcome: nothing     *Pt states it comes and goes, and never knows when it comes.     -------------------------------------    Problem list and histories reviewed & adjusted, as indicated.  Additional history: as documented    Labs reviewed in EPIC    Reviewed and updated as needed this visit by clinical staff  Tobacco  Allergies  Meds       Reviewed and updated as needed this visit by Provider  Allergies  Meds         ROS:  Constitutional, HEENT, cardiovascular, pulmonary, gi and gu systems are negative, except as otherwise noted.    OBJECTIVE:     /84 (BP Location: Left arm, Patient Position: Chair, Cuff Size: Adult Regular)  Pulse 76  Temp 98.3  F (36.8  C) (Oral)  Resp 16  Ht 5' 6\" (1.676 m)  Wt 132 lb (59.9 kg)  LMP 03/20/1995  BMI 21.31 kg/m2  Body mass index is 21.31 kg/(m^2).  GENERAL: healthy, alert and no distress  HENT: normal cephalic/atraumatic, right ear: occluded with wax, after ear lavage red and boggy canal, left ear: normal: no effusions, no erythema, normal landmarks, oropharynx clear and oral mucous membranes moist  NECK: no adenopathy, no asymmetry, masses, or scars and thyroid normal to palpation  RESP: lungs clear to auscultation - no rales, rhonchi or wheezes  CV: regular rates and rhythm and normal S1 S2, no S3 or S4    Diagnostic Test Results:  none     ASSESSMENT/PLAN:             1. Acute otitis externa of right ear, unspecified type    - neomycin-polymyxin-hydrocortisone " (CORTISPORIN) 3.5-74151-6 otic suspension; Place 4 drops into the right ear 3 times daily for 7 days  Dispense: 5 mL; Refill: 0    2. Impacted cerumen of right ear    - neomycin-polymyxin-hydrocortisone (CORTISPORIN) 3.5-91750-1 otic suspension; Place 4 drops into the right ear 3 times daily for 7 days  Dispense: 5 mL; Refill: 0  - HC REMOVAL IMPACTED CERUMEN IRRIGATION/LVG UNILAT    Recheck prn not improving    Kelsey Palomino PA-C  [facility]

## 2018-11-28 ENCOUNTER — OFFICE VISIT (OUTPATIENT)
Dept: URGENT CARE | Facility: URGENT CARE | Age: 73
End: 2018-11-28
Payer: COMMERCIAL

## 2018-11-28 VITALS
OXYGEN SATURATION: 98 % | DIASTOLIC BLOOD PRESSURE: 66 MMHG | TEMPERATURE: 97.6 F | BODY MASS INDEX: 21.5 KG/M2 | WEIGHT: 133.19 LBS | SYSTOLIC BLOOD PRESSURE: 132 MMHG | HEART RATE: 71 BPM

## 2018-11-28 DIAGNOSIS — H61.21 IMPACTED CERUMEN OF RIGHT EAR: ICD-10-CM

## 2018-11-28 DIAGNOSIS — R09.81 CONGESTION OF PARANASAL SINUS: ICD-10-CM

## 2018-11-28 DIAGNOSIS — R05.8 DRY COUGH: Primary | ICD-10-CM

## 2018-11-28 DIAGNOSIS — R09.82 POST-NASAL DRIP: ICD-10-CM

## 2018-11-28 PROCEDURE — 69209 REMOVE IMPACTED EAR WAX UNI: CPT | Mod: RT | Performed by: FAMILY MEDICINE

## 2018-11-28 PROCEDURE — 99213 OFFICE O/P EST LOW 20 MIN: CPT | Mod: 25 | Performed by: FAMILY MEDICINE

## 2018-11-28 RX ORDER — AMOXICILLIN 500 MG/1
500 CAPSULE ORAL 3 TIMES DAILY
Qty: 30 CAPSULE | Refills: 0 | Status: SHIPPED | OUTPATIENT
Start: 2018-12-03 | End: 2019-02-11

## 2018-11-28 NOTE — PROGRESS NOTES
Chief Complaint   Patient presents with     Urgent Care     right ear pain radiating down to throat, slight cough, sinus discomfort and slight headache since 2018. Possible sinus infection, per pt.           SUBJECTIVE:   Romy Schwartz is a 73 year old female presenting with a chief complaint of runny nose, stuffy nose and ear pain right and plugged feeling there  She is an established patient of [facility].  Onset of symptoms was 6 day  Ago.she has been noticing tickle in her throat and also sinus congestion too   Course of illness is worsening.    Severity moderate  Current and Associated symptoms: runny nose, stuffy nose and cough - non-productive  Treatment measures tried include Tylenol/Ibuprofen,   Predisposing factors include None.    Past Medical History:   Diagnosis Date     Arthritis      Normal delivery 1966    no complications with pregnancy or delivery     Urinary incontinence      Current Outpatient Prescriptions   Medication Sig Dispense Refill     [START ON 12/3/2018] amoxicillin (AMOXIL) 500 MG capsule Take 1 capsule (500 mg) by mouth 3 times daily for 10 days 30 capsule 0     Ascorbic Acid (VITAMIN C PO) Take by mouth daily       aspirin 81 MG tablet Take 1 tablet by mouth daily.       calcium carbonate (OS-GERALDINE 500 MG [place]. [place]) 500 MG tablet Take 500 mg by mouth 2 times daily       cholecalciferol (VITAMIN-D) 1000 UNIT capsule Take 1 capsule by mouth daily.       MULTIPLE VITAMIN PO TABS daily       Social History   Substance Use Topics     Smoking status: Never Smoker     Smokeless tobacco: Never Used     Alcohol use Yes      Comment: occasional     Family History   Problem Relation Age of Onset     Heart Disease Father       age 78 also bypass surg     Lipids Mother      alive .      Cancer Mother      Non Hodgkins     Cancer Brother      d. age 47 myelodysplasia from chemotherapy nonhodgkins lymphoma     Family History Negative Brother      alive .       Family History  Negative Brother      alive age b. 1940     Cancer - colorectal Maternal Grandmother       colon cancer         ROS:    10 point ROS of systems including Constitutional, Eyes,, Cardiovascular, Gastroenterology, Genitourinary, Integumentary, Muscularskeletal, Psychiatric were all negative except for pertinent positives noted in my HPI         OBJECTIVE:  /66  Pulse 71  Temp 97.6  F (36.4  C) (Oral)  Wt 133 lb 3 oz (60.4 kg)  LMP 1995  SpO2 98%  Breastfeeding? No  BMI 21.5 kg/m2  GENERAL APPEARANCE: healthy, alert and no distress  EYES: EOMI,  PERRL, conjunctiva clear  HENT:rt  ear canals normal  and impacted cerumen left ear, after wax removal part of TM visible WNL . TM and ear canal normal Nose and mouth without ulcers, erythema or lesions  NECK: supple, nontender, no lymphadenopathy  RESP: lungs clear to auscultation - no rales, rhonchi or wheezes  CV: regular rates and rhythm, normal S1 S2, no murmur noted  ABDOMEN:  soft, nontender, no HSM or masses and bowel sounds normal  SKIN: no suspicious lesions or rashes  Physical Exam      X-Ray was not done.      ASSESSMENT:  Romy was seen today for urgent care.    Diagnoses and all orders for this visit:    Dry cough    Post-nasal drip    Impacted cerumen of right ear  -     HC REMOVAL IMPACTED CERUMEN IRRIGATION/LVG UNILAT    Congestion of paranasal sinus  -     amoxicillin (AMOXIL) 500 MG capsule; Take 1 capsule (500 mg) by mouth 3 times daily for 10 days          PLAN:  Tylenol, Ibuprofen, Saline gargles, Saline nasal spray and Vaporizer  Discussed with pt if symptoms dont get better with symptomatic management   Then consider doing the antibiotic   For impacted wax advised to use debrox to help with symptoms   Follow up if  symptoms fail to improve or worsens   Pt understood and agreed with plan       Kasia Leon MD     See orders in Epic

## 2018-11-28 NOTE — MR AVS SNAPSHOT
After Visit Summary   11/28/2018    Romy Schwartz    MRN: [id number]           Patient Information     Date Of Birth          1945        Visit Information        Provider Department      11/28/2018 10:00 AM Kasia Leon MD [facility]        Today's Diagnoses     Dry cough    -  1    Post-nasal drip        Impacted cerumen of right ear        Congestion of paranasal sinus           Follow-ups after your visit        Your next 10 appointments already scheduled     Jan 02, 2019  7:20 AM CST   PHYSICAL with Mick Mendenhall MD   [facility]    [address] [address] [phone]              Who to contact     If you have questions or need follow up information about today's clinic visit or your schedule please contact [facility] directly at [phone].  Normal or non-critical lab and imaging results will be communicated to you by MyChart, letter or phone within 4 business days after the clinic has received the results. If you do not hear from us within 7 days, please contact the clinic through [employer] or phone. If you have a critical or abnormal lab result, we will notify you by phone as soon as possible.  Submit refill requests through [employer] or call your pharmacy and they will forward the refill request to us. Please allow 3 business days for your refill to be completed.          Additional Information About Your Visit        [employer] Information     [employer] gives you secure access to your electronic health record. If you see a primary care provider, you can also send messages to your care team and make appointments. If you have questions, please call your primary care clinic.  If you do not have a primary care provider, please call [phone] and they will assist you.        Care EveryWhere ID     This is your Care EveryWhere ID. This could  be used by other organizations to access your [place] medical records  [id number]        Your Vitals Were     Pulse Temperature Last Period Pulse Oximetry Breastfeeding? BMI (Body Mass Index)    71 97.6  F (36.4  C) (Oral) 03/20/1995 98% No 21.5 kg/m2       Blood Pressure from Last 3 Encounters:   11/28/18 132/66   06/11/18 126/84   01/16/18 132/75    Weight from Last 3 Encounters:   11/28/18 133 lb 3 oz (60.4 kg)   06/11/18 132 lb (59.9 kg)   01/16/18 137 lb 1.6 oz (62.2 kg)              We Performed the Following     HC REMOVAL IMPACTED CERUMEN IRRIGATION/LVG UNILAT          Today's Medication Changes          These changes are accurate as of 11/28/18 11:36 AM.  If you have any questions, ask your nurse or doctor.               Start taking these medicines.        Dose/Directions    amoxicillin 500 MG capsule   Commonly known as:  AMOXIL   Used for:  Congestion of paranasal sinus   Started by:  Kasia Leon MD        Dose:  500 mg   Start taking on:  12/3/2018   Take 1 capsule (500 mg) by mouth 3 times daily for 10 days   Quantity:  30 capsule   Refills:  0            Where to get your medicines      Some of these will need a paper prescription and others can be bought over the counter.  Ask your nurse if you have questions.     Bring a paper prescription for each of these medications     amoxicillin 500 MG capsule                Primary Care Provider Office Phone # Fax #    Mick Mendenhall [phone]       [address] [place] [id number]        Equal Access to Services     PRIMO FORTUNE AH: Hadii colleen yuen Sobeverley, waaxda luqadaha, qaybta kaalmada adeluis, waxay idimauricio diallo. So [facility] [phone].    ATENCIÓN: Si habla español, tiene a han disposición servicios gratuitos de asistencia lingüística. Llame al [phone].    We comply with applicable federal civil rights laws and [place] laws. We do not discriminate on the basis of race, color, national  origin, age, disability, sex, sexual orientation, or gender identity.            Thank you!     Thank you for choosing [facility]  for your care. Our goal is always to provide you with excellent care. Hearing back from our patients is one way we can continue to improve our services. Please take a few minutes to complete the written survey that you may receive in the mail after your visit with us. Thank you!             Your Updated Medication List - Protect others around you: Learn how to safely use, store and throw away your medicines at www.disposemymeds.org.          This list is accurate as of 11/28/18 11:36 AM.  Always use your most recent med list.                   Brand Name Dispense Instructions for use Diagnosis    amoxicillin 500 MG capsule   Start taking on:  12/3/2018    AMOXIL    30 capsule    Take 1 capsule (500 mg) by mouth 3 times daily for 10 days    Congestion of paranasal sinus       aspirin 81 MG tablet    ASA     Take 1 tablet by mouth daily.        calcium carbonate 500 MG tablet    OS-GERALDINE     Take 500 mg by mouth 2 times daily        cholecalciferol 1000 units (25 mcg) capsule    VITAMIN D3     Take 1 capsule by mouth daily.        Multiple vitamin Tabs      daily        VITAMIN C PO      Take by mouth daily

## 2018-11-30 ENCOUNTER — NURSE TRIAGE (OUTPATIENT)
Dept: NURSING | Facility: CLINIC | Age: 73
End: 2018-11-30

## 2018-11-30 NOTE — TELEPHONE ENCOUNTER
Romy has symptoms of a sinus infection.  She was in Urgent Care on 11/28/2018. She was given a prescription for an antibiotic dated 12/3/2018. She called today because she would like to start taking the antibiotic today and can't because of the start date.    I offered to message both her pcp and the  provider she saw on Wednesday and ask about re-writing the Rx so she could start it today.    I also did a lengthy education piece on viral iinfections versus bacterial infections.  After answering the questions Romy had she decided she would wait until Monday and if still having symptoms will fill and start the Rx for amoxicillin.    Mesha TAPIA RN [facility]

## 2018-12-27 ENCOUNTER — TRANSFERRED RECORDS (OUTPATIENT)
Dept: HEALTH INFORMATION MANAGEMENT | Facility: CLINIC | Age: 73
End: 2018-12-27

## 2019-01-03 ENCOUNTER — TRANSFERRED RECORDS (OUTPATIENT)
Dept: HEALTH INFORMATION MANAGEMENT | Facility: CLINIC | Age: 74
End: 2019-01-03

## 2019-01-10 ENCOUNTER — TRANSFERRED RECORDS (OUTPATIENT)
Dept: HEALTH INFORMATION MANAGEMENT | Facility: CLINIC | Age: 74
End: 2019-01-10

## 2019-02-08 ENCOUNTER — TRANSFERRED RECORDS (OUTPATIENT)
Dept: HEALTH INFORMATION MANAGEMENT | Facility: CLINIC | Age: 74
End: 2019-02-08

## 2019-02-09 PROCEDURE — 82274 ASSAY TEST FOR BLOOD FECAL: CPT | Performed by: INTERNAL MEDICINE

## 2019-02-11 ENCOUNTER — OFFICE VISIT (OUTPATIENT)
Dept: INTERNAL MEDICINE | Facility: CLINIC | Age: 74
End: 2019-02-11
Payer: COMMERCIAL

## 2019-02-11 VITALS
WEIGHT: 133.5 LBS | TEMPERATURE: 98 F | RESPIRATION RATE: 14 BRPM | HEIGHT: 66 IN | BODY MASS INDEX: 21.45 KG/M2 | DIASTOLIC BLOOD PRESSURE: 70 MMHG | OXYGEN SATURATION: 98 % | SYSTOLIC BLOOD PRESSURE: 116 MMHG | HEART RATE: 67 BPM

## 2019-02-11 DIAGNOSIS — M81.0 OSTEOPOROSIS, UNSPECIFIED OSTEOPOROSIS TYPE, UNSPECIFIED PATHOLOGICAL FRACTURE PRESENCE: ICD-10-CM

## 2019-02-11 DIAGNOSIS — Z00.00 MEDICARE ANNUAL WELLNESS VISIT, SUBSEQUENT: Primary | ICD-10-CM

## 2019-02-11 DIAGNOSIS — Z12.31 VISIT FOR SCREENING MAMMOGRAM: ICD-10-CM

## 2019-02-11 DIAGNOSIS — Z12.11 COLON CANCER SCREENING: ICD-10-CM

## 2019-02-11 DIAGNOSIS — Z13.6 CARDIOVASCULAR SCREENING; LDL GOAL LESS THAN 160: ICD-10-CM

## 2019-02-11 LAB
ALBUMIN SERPL-MCNC: 4.2 G/DL (ref 3.4–5)
ALP SERPL-CCNC: 85 U/L (ref 40–150)
ALT SERPL W P-5'-P-CCNC: 53 U/L (ref 0–50)
ANION GAP SERPL CALCULATED.3IONS-SCNC: <1 MMOL/L (ref 3–14)
AST SERPL W P-5'-P-CCNC: 36 U/L (ref 0–45)
BILIRUB SERPL-MCNC: 0.4 MG/DL (ref 0.2–1.3)
BUN SERPL-MCNC: 11 MG/DL (ref 7–30)
CALCIUM SERPL-MCNC: 9.3 MG/DL (ref 8.5–10.1)
CHLORIDE SERPL-SCNC: 104 MMOL/L (ref 94–109)
CHOLEST SERPL-MCNC: 213 MG/DL
CO2 SERPL-SCNC: 30 MMOL/L (ref 20–32)
CREAT SERPL-MCNC: 0.59 MG/DL (ref 0.52–1.04)
GFR SERPL CREATININE-BSD FRML MDRD: 90 ML/MIN/{1.73_M2}
GLUCOSE SERPL-MCNC: 94 MG/DL (ref 70–99)
HDLC SERPL-MCNC: 65 MG/DL
HGB BLD-MCNC: 12.5 G/DL (ref 11.7–15.7)
LDLC SERPL CALC-MCNC: 129 MG/DL
NONHDLC SERPL-MCNC: 148 MG/DL
POTASSIUM SERPL-SCNC: 4.4 MMOL/L (ref 3.4–5.3)
PROT SERPL-MCNC: 7.9 G/DL (ref 6.8–8.8)
SODIUM SERPL-SCNC: 134 MMOL/L (ref 133–144)
TRIGL SERPL-MCNC: 93 MG/DL
TSH SERPL DL<=0.005 MIU/L-ACNC: 3.04 MU/L (ref 0.4–4)

## 2019-02-11 PROCEDURE — 80061 LIPID PANEL: CPT | Performed by: INTERNAL MEDICINE

## 2019-02-11 PROCEDURE — 99213 OFFICE O/P EST LOW 20 MIN: CPT | Mod: 25 | Performed by: INTERNAL MEDICINE

## 2019-02-11 PROCEDURE — 84443 ASSAY THYROID STIM HORMONE: CPT | Performed by: INTERNAL MEDICINE

## 2019-02-11 PROCEDURE — 36415 COLL VENOUS BLD VENIPUNCTURE: CPT | Performed by: INTERNAL MEDICINE

## 2019-02-11 PROCEDURE — G0439 PPPS, SUBSEQ VISIT: HCPCS | Performed by: INTERNAL MEDICINE

## 2019-02-11 PROCEDURE — 85018 HEMOGLOBIN: CPT | Performed by: INTERNAL MEDICINE

## 2019-02-11 PROCEDURE — 80053 COMPREHEN METABOLIC PANEL: CPT | Performed by: INTERNAL MEDICINE

## 2019-02-11 RX ORDER — ALENDRONATE SODIUM 70 MG/1
70 TABLET ORAL
Qty: 12 TABLET | Refills: 3 | Status: SHIPPED | OUTPATIENT
Start: 2019-02-11 | End: 2020-01-03

## 2019-02-11 ASSESSMENT — ACTIVITIES OF DAILY LIVING (ADL): CURRENT_FUNCTION: NO ASSISTANCE NEEDED

## 2019-02-11 ASSESSMENT — MIFFLIN-ST. JEOR: SCORE: 1122.3

## 2019-02-11 NOTE — PROGRESS NOTES
"  SUBJECTIVE:   Romy Schwartz is a 73 year old female who presents for Preventive Visit.  Are you in the first 12 months of your Medicare Part B coverage?  No    Answers for HPI/ROS submitted by the patient on 2/11/2019   Annual Exam:  In general, how would you rate your overall physical health?: good  Frequency of exercise:: 6-7 days/week  Do you usually eat at least 4 servings of fruit and vegetables a day, include whole grains & fiber, and avoid regularly eating high fat or \"junk\" foods? : Yes  Taking medications regularly:: Yes  Medication side effects:: Not applicable  Activities of Daily Living: no assistance needed  Home safety: no safety concerns identified  Hearing Impairment:: no hearing concerns  In the past 6 months, have you been bothered by leaking of urine?: Yes  In general, how would you rate your overall mental or emotional health?: good  Additional concerns today:: Yes  Duration of exercise:: 45-60 minutes      Do you feel safe in your environment? Yes    Do you have a Health Care Directive? Yes: Advance Directive has been received and scanned.    Additional concerns to address?  No    Fall risk:  Fall Risk Assessment not completed.      Do you have sleep apnea, excessive snoring or daytime drowsiness?: no      Reviewed and updated as needed this visit by clinical staff         Reviewed and updated as needed this visit by Provider        Social History     Tobacco Use     Smoking status: Never Smoker     Smokeless tobacco: Never Used   Substance Use Topics     Alcohol use: Yes     Comment: occasional                           Current providers sharing in care for this patient include:   Patient Care Team:  Mick Mendenhall MD as PCP - General (Internal Medicine)  Mick Mendenhall MD as PCP - Assigned PCP    The following health maintenance items are reviewed in Epic and correct as of today:  Health Maintenance   Topic Date Due     ZOSTER IMMUNIZATION (2 of 3) 07/11/2012     PHQ-2 Q1 YR  " "01/16/2019     FALL RISK ASSESSMENT  01/16/2019     MAMMO SCREEN Q2 YR (SYSTEM ASSIGNED)  03/21/2020     LIPID SCREEN Q5 YR FEMALE (SYSTEM ASSIGNED)  01/16/2023     ADVANCE DIRECTIVE PLANNING Q5 YRS  01/16/2023     COLONOSCOPY Q10 YR  05/06/2025     DTAP/TDAP/TD IMMUNIZATION (3 - Td) 01/10/2027     DEXA SCAN SCREENING (SYSTEM ASSIGNED)  Completed     INFLUENZA VACCINE  Completed     IPV IMMUNIZATION  Aged Out     MENINGITIS IMMUNIZATION  Aged Out       Immunization History   Administered Date(s) Administered     Influenza (High Dose) 3 valent vaccine 10/17/2014, 12/15/2015, 10/20/2016, 11/15/2017, 11/07/2018     Influenza (IIV3) PF 11/02/1999, 11/20/2010, 11/13/2012     Influenza Vaccine IM 3yrs+ 4 Valent IIV4 11/12/2013     Pneumo Conj 13-V (2010&after) 12/15/2015     Pneumococcal 23 valent 06/08/2010     TD (ADULT, 7+) 01/10/2017     TDAP Vaccine (Adacel) 07/05/2006     Zoster vaccine, live 05/16/2012       ROS:  CONSTITUTIONAL: NEGATIVE for fever, chills, change in weight  INTEGUMENTARY/SKIN: NEGATIVE for worrisome rashes, moles or lesions  EYES: NEGATIVE for vision changes or irritation  ENT/MOUTH: NEGATIVE for ear, mouth and throat problems  RESP: NEGATIVE for significant cough or SOB  CV: NEGATIVE for chest pain, palpitations or peripheral edema  GI: NEGATIVE for nausea, abdominal pain, heartburn, or change in bowel habits  : NEGATIVE for frequency, dysuria, or hematuria  MUSCULOSKELETAL: NEGATIVE for significant arthralgias or myalgia  NEURO: NEGATIVE for weakness, dizziness or paresthesias  ENDOCRINE: NEGATIVE for temperature intolerance, skin/hair changes  HEME: NEGATIVE for bleeding problems  PSYCHIATRIC: NEGATIVE for changes in mood or affect    OBJECTIVE:   /70   Pulse 67   Temp 98  F (36.7  C) (Oral)   Resp 14   Ht 1.676 m (5' 6\")   Wt 60.6 kg (133 lb 8 oz)   LMP 03/20/1995   SpO2 98%   BMI 21.55 kg/m   Estimated body mass index is 21.55 kg/m  as calculated from the following:    Height " "as of this encounter: 1.676 m (5' 6\").    Weight as of this encounter: 60.6 kg (133 lb 8 oz).  EXAM:   GENERAL: alert and no distress  EYES: Eyes grossly normal to inspection, PERRL and conjunctivae and sclerae normal  HENT: ear canals and TM's normal, nose and mouth without ulcers or lesions  NECK: no adenopathy, no asymmetry, masses, or scars and thyroid normal to palpation  RESP: lungs clear to auscultation - no rales, rhonchi or wheezes  CV: regular rate and rhythm, normal S1 S2, no S3 or S4, no click or rub, no peripheral edema and peripheral pulses strong  ABDOMEN: soft, nontender, no hepatosplenomegaly, no masses and bowel sounds normal.  MS: Patient's right elbow is in a sling device due to recent right proximal ulna fracture distal to the existing olecranon plate.  NEURO: No focal changes  PSYCH: mentation appears normal, affect normal/bright      ASSESSMENT / PLAN:   1. Medicare annual wellness visit, subsequent  Advised patient of need for updated shingles vaccination per  - Hemoglobin  - Comprehensive metabolic panel  - Lipid Profile  - TSH with free T4 reflex    2. CARDIOVASCULAR SCREENING; LDL GOAL LESS THAN 160  Labs ordered as stable continue with dietary change  - Lipid Profile    3. Visit for screening mammogram  Ordered as routine screening  - *MA Screening Digital Bilateral; Future    4. Colon cancer screening  Prior colonoscopy reviewed  - Fecal colorectal cancer screen (FIT); Future    5. Osteoporosis, unspecified osteoporosis type, unspecified pathological fracture presence  Reviewed patient's bone density scan as osteoporosis and advised treatment in the setting of recent fracture.  Discussed dosing and side effects and options of medication as patient is agreeable to treatment  - alendronate (FOSAMAX) 70 MG tablet; Take 1 tablet (70 mg) by mouth every 7 days  Dispense: 12 tablet; Refill: 3  - OFFICE/OUTPT VISIT,ESTLEVL III    Instructions on Fosamax use and side effects - particularly " "esophageal adverse events - are carefully reviewed with her. This drug must be taken upon arising for the day on an empty stomach, with a large 6-8 ounce glass of water; she must remain NPO in the upright position for at least 30 minutes afterwards and until after the first food of the day. If esophageal irritation is noted, she will stop the drug and call my office.      End of Life Planning:  Patient currently has an advanced directive: Yes.  Practitioner is supportive of decision.    COUNSELING:  Reviewed preventive health counseling, as reflected in patient instructions       Regular exercise       Healthy diet/nutrition    BP Readings from Last 1 Encounters:   11/28/18 132/66     Estimated body mass index is 21.5 kg/m  as calculated from the following:    Height as of 6/11/18: 1.676 m (5' 6\").    Weight as of 11/28/18: 60.4 kg (133 lb 3 oz).           reports that  has never smoked. she has never used smokeless tobacco.      Appropriate preventive services were discussed with this patient, including applicable screening as appropriate for cardiovascular disease, diabetes, osteopenia/osteoporosis, and glaucoma.  As appropriate for age/gender, discussed screening for colorectal cancer, prostate cancer, breast cancer, and cervical cancer. Checklist reviewing preventive services available has been given to the patient.    Reviewed patients plan of care and provided an AVS. The Basic Care Plan (routine screening as documented in Health Maintenance) for Romy meets the Care Plan requirement. This Care Plan has been established and reviewed with the Patient.    Counseling Resources:  ATP IV Guidelines  Pooled Cohorts Equation Calculator  Breast Cancer Risk Calculator  FRAX Risk Assessment  ICSI Preventive Guidelines  Dietary Guidelines for Americans, 2010  USDA's MyPlate  ASA Prophylaxis  Lung CA Screening    Mick Mendenhall MD  [facility]    "

## 2019-02-11 NOTE — LETTER
[facility]  [address] [place] [address]  [phone]      2/11/2019       Romy Schwartz  [address]  [facility] [address]        Dear Romy,    I am pleased to inform you that your routine blood work including your hemoglobin, sodium, potassium, calcium and kidney function tests are all normal.    Your cholesterol is slightly high and could be treated more aggressively with better diet, exercise and weight loss.  Please follow-up with me to discuss your further medication options/changes if you are interested.    One of your liver function tests is just slightly abnormal and should be rechecked here in the clinic in one month with a follow-up visit with me.  I will look forward to seeing you at that time and please call to make an appointment.    Your thyroid function tests look good and thus I would not change anything at this point.    Sincerely,      Mick Mendenhall MD  Internal Medicine

## 2019-02-11 NOTE — PATIENT INSTRUCTIONS
Services Typically covered by Medicare Recommended Completed   Vaccines    Pneumonoccol    Influenza    Hepatitis B (if medium/high risk)     Once for patients after age 65    Yearly  Medium/high risk factors:    End Stage Kidney Disease    Hemophiliacs who received Factor XIII or IX concentrates    Clients of institutions for developmentally disabled    Persons who live in same house as a Hepatitis B carrier    Homosexual men    Illicit injectable drug users    Health care workers     Mammogram Covered: One-time screen between age 35-39, annually for age 40+     Pap and Pelvic Exam Covered: Annually if  high risk,  or childbearing age with abnormal Pap in last 3 years.  Q24 months for all other women     Prostate Cancer Screening    Digital rectal exam    PSA Covered: Annually for all men > age 50     Corolrectal Cancer Screening Screening colonoscopy every 10 years, more often for high risk patients     Diabetes Self-Management Training Requires referral by treating physician for patient with diabetes     Diabetes Screening    Fasting blood sugar or glucose tolerance test   Once yearly, twice yearly if prediabetic     Cardiovascular Screening Blood Tests    Total Cholesterol    HDL    Triglycerides Every 5 years     Medical Nutrition Therapy for Diabetes or Renal Disease Requires referral by treating physician for patient with diabetes or kidney disease     Glaucoma Screening Annually for patients with one of the following risk factors:    Diabetes Mellitus    Family history of Glaucoma    -American age 50 and over    -American age 65 and over     Bone Mass Measurement Every 24 months if one of the following risk factors:    Estrogen deficiency    Vertebral abnormalities on x-ray indicative of Osteoporosis, Osteopenia, or Vertebral fracture    Receiving/expected to receive the equivalent of at least 5 mg of Prednisone per day for > 3 months    Hyperparathyroidism    Patient being monitored for  response to Osteoporosis Therapy     One-time AAA screen  Must be ordered as part of Medicare IPPE   Any patient with a family history of AAA    Males Age 65-75, with history of smoking at least 100 cigarettes in lifetime     Smoking Cessation Counseling Beneficiaries who use tobacco are eligible to receive 2 cessation attempts per year; each attempt includes maximum of 4 sessions     HIV Screening Annually for beneficiaries at increased risk:       Increased risk for HIV infection is defined in the  National Coverage Determinations (NCD) Manual,  Publication 100-03 Sections 190.14 (diagnostic) and 210.7 (screening). See http://www.cms.gov/manuals/downloads/dux580r7_Caxd2.pdf and http://www.cms.gov/manuals/downloads/tdo984t3_Txbg7.pdf on the Internet.  Three times per pregnancy for beneficiaries who are pregnant.     Future Annual Wellness Visit Annually, for all beneficiaries.

## 2019-02-15 DIAGNOSIS — Z12.11 COLON CANCER SCREENING: ICD-10-CM

## 2019-02-15 LAB — HEMOCCULT STL QL IA: NEGATIVE

## 2019-04-23 ENCOUNTER — TRANSFERRED RECORDS (OUTPATIENT)
Dept: HEALTH INFORMATION MANAGEMENT | Facility: CLINIC | Age: 74
End: 2019-04-23

## 2019-04-23 ENCOUNTER — ANCILLARY PROCEDURE (OUTPATIENT)
Dept: MAMMOGRAPHY | Facility: CLINIC | Age: 74
End: 2019-04-23
Attending: INTERNAL MEDICINE
Payer: COMMERCIAL

## 2019-04-23 DIAGNOSIS — Z12.31 VISIT FOR SCREENING MAMMOGRAM: ICD-10-CM

## 2019-04-23 PROCEDURE — 77067 SCR MAMMO BI INCL CAD: CPT | Mod: TC

## 2019-10-02 ENCOUNTER — HEALTH MAINTENANCE LETTER (OUTPATIENT)
Age: 74
End: 2019-10-02

## 2019-12-04 ENCOUNTER — OFFICE VISIT (OUTPATIENT)
Dept: URGENT CARE | Facility: URGENT CARE | Age: 74
End: 2019-12-04
Payer: COMMERCIAL

## 2019-12-04 VITALS
WEIGHT: 139 LBS | DIASTOLIC BLOOD PRESSURE: 78 MMHG | BODY MASS INDEX: 22.44 KG/M2 | HEART RATE: 65 BPM | TEMPERATURE: 97.4 F | SYSTOLIC BLOOD PRESSURE: 128 MMHG | OXYGEN SATURATION: 100 % | RESPIRATION RATE: 18 BRPM

## 2019-12-04 DIAGNOSIS — J01.90 ACUTE SINUSITIS WITH SYMPTOMS > 10 DAYS: Primary | ICD-10-CM

## 2019-12-04 DIAGNOSIS — R51.9 SINUS HEADACHE: ICD-10-CM

## 2019-12-04 PROCEDURE — 99214 OFFICE O/P EST MOD 30 MIN: CPT | Performed by: PHYSICIAN ASSISTANT

## 2019-12-04 RX ORDER — AMOXICILLIN 875 MG
875 TABLET ORAL 2 TIMES DAILY
Qty: 20 TABLET | Refills: 0 | Status: SHIPPED | OUTPATIENT
Start: 2019-12-04 | End: 2020-02-17

## 2019-12-04 NOTE — PATIENT INSTRUCTIONS
(J01.90) Acute sinusitis with symptoms > 10 days  (primary encounter diagnosis)  Comment:   Plan: amoxicillin (AMOXIL) 875 MG tablet        Saline nasal spray    (R51) Sinus headache  Comment:   Plan: acetaminophen-codeine (TYLENOL #3) 300-30 MG         tablet        For pain not resolved with ibuprofen.      Follow up with primary clinic should symptoms persist or worsen.

## 2019-12-04 NOTE — PROGRESS NOTES
Patient presents with:  Urgent Care: headache and sinus pressure for 2 weeks    SUBJECTIVE:   Romy Schwartz is a 74 year old female presenting with a chief complaint of   1) runny nose and congestion for the past 2 weeks,   2) sinus pain and headache.    No fevers.    Denies any one sided weakness, numbness or tingling.    Onset of symptoms was as above  Course of illness is worsening.    Severity moderate  Current and Associated symptoms: as above   Treatment measures tried include tylenol  Predisposing factors include None.    Had an eye exam 9/2019      Past Medical History:   Diagnosis Date     Arthritis      Normal delivery 1966    no complications with pregnancy or delivery     Urinary incontinence      Patient Active Problem List   Diagnosis     Lumbago     CARDIOVASCULAR SCREENING; LDL GOAL LESS THAN 160     Urgency-frequency syndrome     ACP (advance care planning)     Fracture closed, humerus     Anemia due to blood loss, acute     Anxiety     Episodic tension-type headache, not intractable     Social History     Tobacco Use     Smoking status: Never Smoker     Smokeless tobacco: Never Used   Substance Use Topics     Alcohol use: Yes     Comment: occasional       ROS:  CONSTITUTIONAL:NEGATIVE for fever, chills, change in weight  INTEGUMENTARY/SKIN: NEGATIVE for worrisome rashes, moles or lesions  EYES: NEGATIVE for vision changes or irritation  ENT/MOUTH: as per HPI  RESP:NEGATIVE for significant cough or SOB  CV: NEGATIVE for chest pain, palpitations or peripheral edema  GI: NEGATIVE for nausea, abdominal pain, heartburn, or change in bowel habits  MUSCULOSKELETAL: NEGATIVE for significant arthralgias or myalgia  NEURO: as per HPI  ENDOCRINE: NEGATIVE for temperature intolerance, skin/hair changes  Review of systems negative except as stated above.    OBJECTIVE  :/78   Pulse 65   Temp 97.4  F (36.3  C) (Tympanic)   Resp 18   Wt 63 kg (139 lb)   LMP 03/20/1995   SpO2 100%   BMI 22.44 kg/m     GENERAL APPEARANCE: healthy, alert and no distress  EYES: EOMI,  PERRL, conjunctiva clear  HENT: ear canals and TM's normal.  Nose and mouth without ulcers, erythema or lesions  HENT: nasal turbinates boggy with bluish hue and rhinorrhea yellow  NECK: supple, nontender, no lymphadenopathy  RESP: lungs clear to auscultation - no rales, rhonchi or wheezes  CV: regular rates and rhythm, normal S1 S2, no murmur noted  ABDOMEN:  soft, nontender, no HSM or masses and bowel sounds normal  NEURO: Normal strength and tone, sensory exam grossly normal,  normal speech and mentation  SKIN: no suspicious lesions or rashes    (J01.90) Acute sinusitis with symptoms > 10 days  (primary encounter diagnosis)  Comment:   Plan: amoxicillin (AMOXIL) 875 MG tablet        Saline nasal spray    (R51) Sinus headache  Comment:   Plan: acetaminophen-codeine (TYLENOL #3) 300-30 MG         tablet          Use codeine with caution.      Follow up with primary clinic should symptoms persist or worsen.

## 2020-01-03 DIAGNOSIS — M81.0 OSTEOPOROSIS, UNSPECIFIED OSTEOPOROSIS TYPE, UNSPECIFIED PATHOLOGICAL FRACTURE PRESENCE: ICD-10-CM

## 2020-01-03 RX ORDER — ALENDRONATE SODIUM 70 MG/1
70 TABLET ORAL
Qty: 12 TABLET | Refills: 1 | Status: SHIPPED | OUTPATIENT
Start: 2020-01-03 | End: 2020-02-17

## 2020-01-03 NOTE — TELEPHONE ENCOUNTER
Prescription approved per [facility] Refill Protocol.    Ilene SOTELON, RN, PHN

## 2020-01-03 NOTE — TELEPHONE ENCOUNTER
"Requested Prescriptions   Pending Prescriptions Disp Refills     alendronate (FOSAMAX) 70 MG tablet 12 tablet 3     Sig: Take 1 tablet (70 mg) by mouth every 7 days       Bisphosphonates Passed - 1/3/2020  7:23 AM        Passed - Recent (12 mo) or future (30 days) visit within the authorizing provider's specialty     Patient has had an office visit with the authorizing provider or a provider within the authorizing providers department within the previous 12 mos or has a future within next 30 days. See \"Patient Info\" tab in inbasket, or \"Choose Columns\" in Meds & Orders section of the refill encounter.              Passed - Dexa on file within past 2 years     Please review last Dexa result.           Passed - Medication is active on med list        Passed - Patient is age 18 or older        Passed - Normal serum creatinine on file within past 12 months     Recent Labs   Lab Test 02/11/19  1143   CR 0.59             Last Written Prescription Date:  2/11/2019  Last Fill Quantity: 12,  # refills: 3   Last office visit: 2/11/2019 with prescribing provider:  2/11/2019   Future Office Visit:   Next 5 appointments (look out 90 days)    Feb 17, 2020  8:00 AM CST  PHYSICAL with Mick Mendenhall MD  [facility] [address] [address] [phone]           "

## 2020-02-13 ENCOUNTER — MYC MEDICAL ADVICE (OUTPATIENT)
Dept: INTERNAL MEDICINE | Facility: CLINIC | Age: 75
End: 2020-02-13

## 2020-02-17 ENCOUNTER — OFFICE VISIT (OUTPATIENT)
Dept: INTERNAL MEDICINE | Facility: CLINIC | Age: 75
End: 2020-02-17
Payer: COMMERCIAL

## 2020-02-17 VITALS
TEMPERATURE: 96.2 F | DIASTOLIC BLOOD PRESSURE: 80 MMHG | OXYGEN SATURATION: 100 % | WEIGHT: 136 LBS | BODY MASS INDEX: 21.86 KG/M2 | RESPIRATION RATE: 14 BRPM | HEART RATE: 64 BPM | HEIGHT: 66 IN | SYSTOLIC BLOOD PRESSURE: 134 MMHG

## 2020-02-17 DIAGNOSIS — Z13.6 CARDIOVASCULAR SCREENING; LDL GOAL LESS THAN 160: ICD-10-CM

## 2020-02-17 DIAGNOSIS — Z12.31 VISIT FOR SCREENING MAMMOGRAM: ICD-10-CM

## 2020-02-17 DIAGNOSIS — M81.0 OSTEOPOROSIS, UNSPECIFIED OSTEOPOROSIS TYPE, UNSPECIFIED PATHOLOGICAL FRACTURE PRESENCE: ICD-10-CM

## 2020-02-17 DIAGNOSIS — Z00.00 ENCOUNTER FOR MEDICARE ANNUAL WELLNESS EXAM: Primary | ICD-10-CM

## 2020-02-17 DIAGNOSIS — Z12.11 COLON CANCER SCREENING: ICD-10-CM

## 2020-02-17 LAB
ALBUMIN SERPL-MCNC: 3.8 G/DL (ref 3.4–5)
ALP SERPL-CCNC: 56 U/L (ref 40–150)
ALT SERPL W P-5'-P-CCNC: 31 U/L (ref 0–50)
ANION GAP SERPL CALCULATED.3IONS-SCNC: 2 MMOL/L (ref 3–14)
AST SERPL W P-5'-P-CCNC: 23 U/L (ref 0–45)
BILIRUB SERPL-MCNC: 0.4 MG/DL (ref 0.2–1.3)
BUN SERPL-MCNC: 12 MG/DL (ref 7–30)
CALCIUM SERPL-MCNC: 8.8 MG/DL (ref 8.5–10.1)
CHLORIDE SERPL-SCNC: 106 MMOL/L (ref 94–109)
CHOLEST SERPL-MCNC: 220 MG/DL
CO2 SERPL-SCNC: 30 MMOL/L (ref 20–32)
CREAT SERPL-MCNC: 0.64 MG/DL (ref 0.52–1.04)
GFR SERPL CREATININE-BSD FRML MDRD: 87 ML/MIN/{1.73_M2}
GLUCOSE SERPL-MCNC: 97 MG/DL (ref 70–99)
HDLC SERPL-MCNC: 72 MG/DL
HGB BLD-MCNC: 13 G/DL (ref 11.7–15.7)
LDLC SERPL CALC-MCNC: 130 MG/DL
NONHDLC SERPL-MCNC: 148 MG/DL
POTASSIUM SERPL-SCNC: 4 MMOL/L (ref 3.4–5.3)
PROT SERPL-MCNC: 7.4 G/DL (ref 6.8–8.8)
SODIUM SERPL-SCNC: 138 MMOL/L (ref 133–144)
TRIGL SERPL-MCNC: 89 MG/DL

## 2020-02-17 PROCEDURE — 80053 COMPREHEN METABOLIC PANEL: CPT | Performed by: INTERNAL MEDICINE

## 2020-02-17 PROCEDURE — 80061 LIPID PANEL: CPT | Performed by: INTERNAL MEDICINE

## 2020-02-17 PROCEDURE — 99397 PER PM REEVAL EST PAT 65+ YR: CPT | Performed by: INTERNAL MEDICINE

## 2020-02-17 PROCEDURE — 85018 HEMOGLOBIN: CPT | Performed by: INTERNAL MEDICINE

## 2020-02-17 PROCEDURE — 36415 COLL VENOUS BLD VENIPUNCTURE: CPT | Performed by: INTERNAL MEDICINE

## 2020-02-17 RX ORDER — ALENDRONATE SODIUM 70 MG/1
70 TABLET ORAL
Qty: 12 TABLET | Refills: 1 | Status: SHIPPED | OUTPATIENT
Start: 2020-02-17 | End: 2020-06-24

## 2020-02-17 ASSESSMENT — MIFFLIN-ST. JEOR: SCORE: 1120.7

## 2020-02-17 ASSESSMENT — ACTIVITIES OF DAILY LIVING (ADL): CURRENT_FUNCTION: NO ASSISTANCE NEEDED

## 2020-02-17 NOTE — PATIENT INSTRUCTIONS
Patient Education   Personalized Prevention Plan  You are due for the preventive services outlined below.  Your care team is available to assist you in scheduling these services.  If you have already completed any of these items, please share that information with your care team to update in your medical record.  Health Maintenance Due   Topic Date Due     Zoster (Shingles) Vaccine (2 of 3) 07/11/2012     FALL RISK ASSESSMENT  01/16/2019     PHQ-2  01/01/2020     Annual Wellness Visit  02/11/2020

## 2020-02-17 NOTE — LETTER
[facility]  [address] [address]  [phone]      2/17/2020       Romy Schwartz  [address] [facility] [address]        Dear Romy,    I am pleased to inform you that your routine blood work including your hemoglobin, sodium, potassium, calcium, kidney and liver function tests are all normal.    Your cholesterol is slightly high and could be treated more aggressively with better diet, exercise and weight loss.  Please follow-up with me to discuss your further medication options/changes if you are interested.    Sincerely,      Mick Mendenhall MD  Internal Medicine

## 2020-02-17 NOTE — PROGRESS NOTES
"Answers for HPI/ROS submitted by the patient on 2020     Annual Exam:  In general, how would you rate your overall physical health?: good  Frequency of exercise:: 6-7 days/week  Do you usually eat at least 4 servings of fruit and vegetables a day, include whole grains & fiber, and avoid regularly eating high fat or \"junk\" foods? : Yes  Taking medications regularly:: Yes  Medication side effects:: Other  Activities of Daily Living: no assistance needed  Home safety: no safety concerns identified  Hearing Impairment:: no hearing concerns  In the past 6 months, have you been bothered by leaking of urine?: No  In general, how would you rate your overall mental or emotional health?: good  Additional concerns today:: No  Duration of exercise:: 45-60 minute    Are you in the first 12 months of your Medicare Part B coverage?  No  Fall risk:  Fallen 2 or more times in the past year?: No  Any fall with injury in the past year?: No    Cognitive Screenin) Repeat 3 items (Leader, Season, Table)    2) Clock draw: NORMAL  3) 3 item recall: Recalls 3 objects  Results: 3 items recalled: COGNITIVE IMPAIRMENT LESS LIKELY    Mini-CogTM Copyright S Tiburcio. Licensed by the author for use in [facility]; reprinted with permission (soob@.[email or web address]). All rights reserved.      Do you have sleep apnea, excessive snoring or daytime drowsiness?: no    Reviewed and updated as needed this visit by clinical staff       Reviewed and updated as needed this visit by Provider        Social History     Tobacco Use     Smoking status: Never Smoker     Smokeless tobacco: Never Used   Substance Use Topics     Alcohol use: Yes     Comment: occasional                           Current providers sharing in care for this patient include:   Patient Care Team:  Mick Mendenhall MD as PCP - General (Internal Medicine)  Mick Mendenhall MD as Assigned PCP    The following health maintenance items are reviewed in Epic and correct as of " "today:  Health Maintenance   Topic Date Due     ZOSTER IMMUNIZATION (2 of 3) 07/11/2012     FALL RISK ASSESSMENT  01/16/2019     PHQ-2  01/01/2020     MEDICARE ANNUAL WELLNESS VISIT  02/11/2020     MAMMO SCREENING  04/23/2021     ADVANCE CARE PLANNING  01/16/2023     LIPID  02/11/2024     COLONOSCOPY  05/06/2025     DTAP/TDAP/TD IMMUNIZATION (3 - Td) 01/10/2027     DEXA  Completed     INFLUENZA VACCINE  Completed     PNEUMOCOCCAL IMMUNIZATION 65+ LOW/MEDIUM RISK  Completed     IPV IMMUNIZATION  Aged Out     MENINGITIS IMMUNIZATION  Aged Out       Immunization History   Administered Date(s) Administered     Influenza (High Dose) 3 valent vaccine 10/17/2014, 12/15/2015, 10/20/2016, 11/15/2017, 11/07/2018     Influenza (IIV3) PF 11/02/1999, 11/20/2010, 11/13/2012     Influenza Vaccine IM > 6 months Valent IIV4 11/12/2013, 10/31/2019     Pneumo Conj 13-V (2010&after) 12/15/2015     Pneumococcal 23 valent 06/08/2010     TD (ADULT, 7+) 01/10/2017     TDAP Vaccine (Adacel) 07/05/2006     Zoster vaccine, live 05/16/2012         ROS:  CONSTITUTIONAL: NEGATIVE for fever, chills, change in weight  INTEGUMENTARY/SKIN: NEGATIVE for worrisome rashes, moles or lesions  EYES: NEGATIVE for vision changes or irritation  ENT/MOUTH: NEGATIVE for ear, mouth and throat problems  RESP: NEGATIVE for significant cough or SOB  CV: NEGATIVE for chest pain, palpitations or peripheral edema  GI: NEGATIVE for nausea, abdominal pain, heartburn, or change in bowel habits  : NEGATIVE for frequency, dysuria, or hematuria  MUSCULOSKELETAL: NEGATIVE for significant arthralgias or myalgia  NEURO: NEGATIVE for weakness, dizziness or paresthesias  ENDOCRINE: NEGATIVE for temperature intolerance, skin/hair changes  HEME: NEGATIVE for bleeding problems  PSYCHIATRIC: NEGATIVE for changes in mood or affect    OBJECTIVE:   /80   Pulse 64   Temp 96.2  F (35.7  C) (Tympanic)   Resp 14   Ht 1.664 m (5' 5.5\")   Wt 61.7 kg (136 lb)   LMP 03/20/1995  " " SpO2 100%   BMI 22.29 kg/m   Estimated body mass index is 22.29 kg/m  as calculated from the following:    Height as of this encounter: 1.664 m (5' 5.5\").    Weight as of this encounter: 61.7 kg (136 lb).  EXAM:   GENERAL: healthy, alert and no distress  EYES: Eyes grossly normal to inspection, PERRL and conjunctivae and sclerae normal  HENT: ear canals and TM's normal, nose and mouth without ulcers or lesions  NECK: no adenopathy, no asymmetry, masses, or scars and thyroid normal to palpation  RESP: lungs clear to auscultation - no rales, rhonchi or wheezes  CV: regular rate and rhythm, normal S1 S2, no S3 or S4, no murmur, click or rub, no peripheral edema and peripheral pulses strong  ABDOMEN: soft, nontender, no hepatosplenomegaly, no masses and bowel sounds normal  MS: no gross musculoskeletal defects noted, no edema  NEURO: No focal changes  PSYCH: mentation appears normal, affect normal/bright    ASSESSMENT / PLAN:   1. Encounter for Medicare annual wellness exam  Advised shingles vaccination  - Hemoglobin  - Comprehensive metabolic panel  - Lipid Profile    2. CARDIOVASCULAR SCREENING; LDL GOAL LESS THAN 160  As ordered as fasting per routine  - Lipid Profile    3. Colon cancer screening  Colonoscopy reviewed and suggest FIT testing  - Fecal colorectal cancer screen (FIT); Future    4. Visit for screening mammogram  Prior mammogram reviewed and recommended updated mammogram  - *MA Screening Digital Bilateral; Future    5. Osteoporosis, unspecified osteoporosis type, unspecified pathological fracture presence  Continuing with current bisphosphonate therapy and bone density scan every 3 years  - alendronate (FOSAMAX) 70 MG tablet; Take 1 tablet (70 mg) by mouth every 7 days  Dispense: 12 tablet; Refill: 1    COUNSELING:  Reviewed preventive health counseling, as reflected in patient instructions       Regular exercise       Healthy diet/nutrition    Estimated body mass index is 22.44 kg/m  as calculated " "from the following:    Height as of 2/11/19: 1.676 m (5' 6\").    Weight as of 12/4/19: 63 kg (139 lb).       reports that she has never smoked. She has never used smokeless tobacco.    Appropriate preventive services were discussed with this patient, including applicable screening as appropriate for cardiovascular disease, diabetes, osteopenia/osteoporosis, and glaucoma.  As appropriate for age/gender, discussed screening for colorectal cancer, prostate cancer, breast cancer, and cervical cancer. Checklist reviewing preventive services available has been given to the patient.    Reviewed patients plan of care and provided an AVS. The Basic Care Plan (routine screening as documented in Health Maintenance) for Romy meets the Care Plan requirement. This Care Plan has been established and reviewed with the Patient.    Counseling Resources:  ATP IV Guidelines  Pooled Cohorts Equation Calculator  Breast Cancer Risk Calculator  FRAX Risk Assessment  ICSI Preventive Guidelines  Dietary Guidelines for Americans, 2010  USDA's MyPlate  ASA Prophylaxis  Lung CA Screening    Mick Mendenhall MD  [facility]  "

## 2020-02-19 ENCOUNTER — TELEPHONE (OUTPATIENT)
Dept: INTERNAL MEDICINE | Facility: CLINIC | Age: 75
End: 2020-02-19

## 2020-02-19 NOTE — TELEPHONE ENCOUNTER
Reason for Call:  Request for results:    Name of test or procedure: DEXA    Date of test of procedure: 03/21/2018    Location of the test or procedure: [facility] to leave the result message on voice mail or with a family member? YES    Phone number Patient can be reached at:  Home number on file [phone] (home)    Additional comments: Patient is requesting DEXA results to mail to her home address.     Call taken on 2/19/2020 at 10:43 AM by KAVITA STEPHENSON

## 2020-02-19 NOTE — LETTER
[address] [address]  [phone]      2020       Romy Schwartz  [address]  [facility] [address]          BONE DENSITOMETRY  [address] [address]  3/21/2018      PATIENT: Romy Schwartz  CHART: [id number]   :  1945  AGE:  72 year old  SEX:  female   REFERRING PROVIDER:  Mick Mendenhall MD     PROCEDURE:  Bone density scanning was performed using DXA technology of the lumbar spine and hip.  Scanning was performed on a Lunar Prodigy scanner.  Reporting is completed in the form of a T-score.  The T-score represents the standard deviation from peak bone mass based on a young healthy adult.     REFERENCE T-SCORES:       Normal                -1.0 and greater                                 Osteopenia         Between -1.0 and -2.5                                           Osteoporosis     -2.5 and less                                       RISK FACTORS:  Post-menopausal, Follow-up osteopenia  CURRENT TREATMENT:  none listed     FINDINGS:               Lumbar Spine (L1-L4)                T-score:  -0.4     Degenerative and/or osteosclerotic changes are present, falsely improving result.                Left Femoral Neck                      T-score:  -1.9               Right Femoral Neck                    T-score:  -2.6                         Lumbar (L1-L4) BMD: 1.144            Previous: 1.160                                              Total Hip Mean BMD: 0.774            Previous: 0.817     Comparison is made to another DXA performed on the same Lunar Prodigy  machine on .       IMPRESSION  Osteoporosis  Consider medication intervention  Recommendations include ensuring adequate daily Calcium and Vitamin D intake     Compared to previous bone densitometry performed on this patient, there is the suggestion of a possible trend towards worsening of the lumbar  spine, and significant worsening of the (total) hip.        Merrill Duncan MD

## 2020-02-21 DIAGNOSIS — Z12.11 COLON CANCER SCREENING: ICD-10-CM

## 2020-02-21 LAB — HEMOCCULT STL QL IA: NEGATIVE

## 2020-02-21 PROCEDURE — 82274 ASSAY TEST FOR BLOOD FECAL: CPT | Performed by: INTERNAL MEDICINE

## 2020-06-24 DIAGNOSIS — M81.0 OSTEOPOROSIS, UNSPECIFIED OSTEOPOROSIS TYPE, UNSPECIFIED PATHOLOGICAL FRACTURE PRESENCE: ICD-10-CM

## 2020-06-24 RX ORDER — ALENDRONATE SODIUM 70 MG/1
70 TABLET ORAL
Qty: 12 TABLET | Refills: 1 | Status: SHIPPED | OUTPATIENT
Start: 2020-06-24 | End: 2020-12-11

## 2020-06-24 NOTE — TELEPHONE ENCOUNTER
Reason for Call:  Medication or medication refill:    Do you use a [facility]?  Name of the pharmacy and phone number for the current request:   [facility]    Name of the medication requested:   alendronate (FOSAMAX) 70 MG tablet     Other request: Romy is asking can refills be on this medication for a full year, rather than phoning every three months.    Can we leave a detailed message on this number? YES    Phone number patient can be reached at: Cell number on file:    Telephone Information:   Mobile [phone]       Best Time: any    Call taken on 6/24/2020 at 10:55 AM by Maile Dave

## 2020-06-24 NOTE — TELEPHONE ENCOUNTER
Routing refill request to provider for review/approval because:  Dexa on file within past 2 years

## 2020-07-08 ENCOUNTER — ANCILLARY PROCEDURE (OUTPATIENT)
Dept: MAMMOGRAPHY | Facility: CLINIC | Age: 75
End: 2020-07-08
Attending: INTERNAL MEDICINE
Payer: COMMERCIAL

## 2020-07-08 DIAGNOSIS — Z12.31 VISIT FOR SCREENING MAMMOGRAM: ICD-10-CM

## 2020-07-08 PROCEDURE — 77063 BREAST TOMOSYNTHESIS BI: CPT | Mod: TC

## 2020-07-08 PROCEDURE — 77067 SCR MAMMO BI INCL CAD: CPT | Mod: TC

## 2020-11-08 ENCOUNTER — VIRTUAL VISIT (OUTPATIENT)
Dept: FAMILY MEDICINE | Facility: OTHER | Age: 75
End: 2020-11-08
Payer: COMMERCIAL

## 2020-11-08 ENCOUNTER — OFFICE VISIT (OUTPATIENT)
Dept: URGENT CARE | Facility: URGENT CARE | Age: 75
End: 2020-11-08
Payer: COMMERCIAL

## 2020-11-08 VITALS — OXYGEN SATURATION: 99 % | HEART RATE: 80 BPM | RESPIRATION RATE: 16 BRPM | TEMPERATURE: 98.2 F

## 2020-11-08 DIAGNOSIS — Z20.828 CONTACT WITH OR EXPOSURE TO VIRAL DISEASE: Primary | ICD-10-CM

## 2020-11-08 LAB
DEPRECATED S PYO AG THROAT QL EIA: NEGATIVE
SPECIMEN SOURCE: NORMAL

## 2020-11-08 PROCEDURE — 87651 STREP A DNA AMP PROBE: CPT | Performed by: FAMILY MEDICINE

## 2020-11-08 PROCEDURE — 99214 OFFICE O/P EST MOD 30 MIN: CPT | Performed by: FAMILY MEDICINE

## 2020-11-08 PROCEDURE — 99421 OL DIG E/M SVC 5-10 MIN: CPT | Performed by: FAMILY MEDICINE

## 2020-11-08 PROCEDURE — 99N1174 PR STATISTIC STREP A RAPID: Performed by: FAMILY MEDICINE

## 2020-11-08 PROCEDURE — U0003 INFECTIOUS AGENT DETECTION BY NUCLEIC ACID (DNA OR RNA); SEVERE ACUTE RESPIRATORY SYNDROME CORONAVIRUS 2 (SARS-COV-2) (CORONAVIRUS DISEASE [COVID-19]), AMPLIFIED PROBE TECHNIQUE, MAKING USE OF HIGH THROUGHPUT TECHNOLOGIES AS DESCRIBED BY CMS-2020-01-R: HCPCS | Performed by: FAMILY MEDICINE

## 2020-11-08 NOTE — PROGRESS NOTES
"Date: 2020 08:08:46  Clinician: Kasia Leon  Clinician NPI: [id number]  Patient: Hyun Schwartz  Patient : 1945  Patient Address[facility] [place] [address]  Patient Phone: [phone]  Visit Protocol: URI  Patient Summary:  Hyun is a 74 year old ( : 1945 ) female who initiated a [facility] Visit for COVID-19 (Coronavirus) evaluation and screening. When asked the question \"Please sign me up to receive news, health information and promotions from [facility].\", Hyun responded \"Yes\".    Hyun states her symptoms started 1-2 days ago.   Her symptoms consist of malaise, a sore throat, a headache, and a cough.   Symptom details     Cough: Hyun coughs a few times an hour and her cough is not more bothersome at night. Phlegm does not come into her throat when she coughs. She does not believe her cough is caused by post-nasal drip.     Sore throat: Hyun reports having mild throat pain (1-3 on a 10 point pain scale), does not have exudate on her tonsils, and can swallow liquids. She is not sure if the lymph nodes in her neck are enlarged. A rash has not appeared on the skin since the sore throat started.     Headache: She states the headache is mild (1-3 on a 10 point pain scale).      Hyun denies having vomiting, rhinitis, facial pain or pressure, myalgias, chills, teeth pain, ageusia, diarrhea, ear pain, wheezing, fever, nasal congestion, nausea, and anosmia. She also denies taking antibiotic medication in the past month and having recent facial or sinus surgery in the past 60 days. She is not experiencing dyspnea.   Precipitating events  Within the past week, Hyun has not been exposed to someone with strep throat. She has not recently been exposed to someone with influenza. Hyun has been in close contact with the following high risk individuals: adults 65 or older.   Pertinent COVID-19 (Coronavirus) information  Hyun does not work or volunteer as healthcare worker or a first " responder. In the past 14 days, Hyun has not worked or volunteered at a healthcare facility or group living setting.   In the past 14 days, she also has not lived in a congregate living setting.   Hyun has had a close contact with a laboratory-confirmed COVID-19 patient within 14 days of symptom onset. She was not exposed at her work. She does not know when she was exposed to the laboratory-confirmed COVID-19 patient.   Additional information about contact with COVID-19 (Coronavirus) patient as reported by the patient (free text): not sure of the answer to that question....i don't know if everyone i have been around has covid.  i have been to grocery store, pharmacy, etc and one [facility].  i wear a mask and use hand     Since December 2019, Hynu has not been tested for COVID-19 and has not had upper respiratory infection or influenza-like illness.   Pertinent medical history  Hyun does not get yeast infections when she takes antibiotics.   Hyun does not need a return to work/school note.   Weight: 136 lbs   Hyun does not smoke or use smokeless tobacco.   Weight: 136 lbs    MEDICATIONS: alendronate oral, ALLERGIES: NKDA  Clinician Response:  Dear Hyun,   Your symptoms show that you may have coronavirus (COVID-19). This illness can cause fever, cough and trouble breathing. Many people get a mild case and get better on their own. Some people can get very sick.  What should I do?  We would like to test you for this virus.   1. Please call [phone] to schedule your visit. Explain that you were referred by [facility] to have a COVID-19 test. Be ready to share your [facility] visit ID number.  * If you need to schedule in [facility] please call [phone] or for [employer] employees please call [phone].  * If you need to schedule in the [place] area please call [phone]. [employer] employees call [phone].  The following will serve as your written order for this COVID Test, ordered by me, for the  "indication of suspected COVID [Z20.828]: The test will be ordered in [employer], our electronic health record, after you are scheduled. It will show as ordered and authorized by Kg Stevenson MD.  Order: COVID-19 (Coronavirus) PCR for SYMPTOMATIC testing from [facility].   2. When it's time for your COVID test:  Stay at least 6 feet away from others. (If someone will drive you to your test, stay in the backseat, as far away from the  as you can.)   Cover your mouth and nose with a mask, tissue or washcloth.  Go straight to the testing site. Don't make any stops on the way there or back.      3.Starting now: Stay home and away from others (self-isolate) until:   You've had no fever---and no medicine that reduces fever---for one full day (24 hours). And...   Your other symptoms have gotten better. For example, your cough or breathing has improved. And...   At least 10 days have passed since your symptoms started.       During this time, don't leave the house except for testing or medical care.   Stay in your own room, even for meals. Use your own bathroom if you can.   Stay away from others in your home. No hugging, kissing or shaking hands. No visitors.  Don't go to work, school or anywhere else.    Clean \"high touch\" surfaces often (doorknobs, counters, handles, etc.). Use a household cleaning spray or wipes. You'll find a full list of  on the EPA website: www.epa.gov/pesticide-registration/list-n-disinfectants-use-against-sars-cov-2.   Cover your mouth and nose with a mask, tissue or washcloth to avoid spreading germs.  Wash your hands and face often. Use soap and water.  Caregivers in these groups are at risk for severe illness due to COVID-19:  o People 65 years and older  o People who live in a nursing home or long-term care facility  o People with chronic disease (lung, heart, cancer, diabetes, kidney, liver, immunologic)  o People who have a weakened immune system, including those who:   Are in cancer " treatment  Take medicine that weakens the immune system, such as corticosteroids  Had a bone marrow or organ transplant  Have an immune deficiency  Have poorly controlled HIV or AIDS  Are obese (body mass index of 40 or higher)  Smoke regularly   o Caregivers should wear gloves while washing dishes, handling laundry and cleaning bedrooms and bathrooms.  o Use caution when washing and drying laundry: Don't shake dirty laundry, and use the warmest water setting that you can.  o For more tips, go to www.cdc.gov/coronavirus/2019-ncov/downloads/10Things.pdf.    4.Sign up for [employer]. We know it's scary to hear that you might have COVID-19. We want to track your symptoms to make sure you're okay over the next 2 weeks. Please look for an email from [employer]---this is a free, online program that we'll use to keep in touch. To sign up, follow the link in the email. Learn more at [email or web address]  How can I take care of myself?   Get lots of rest. Drink extra fluids (unless a doctor has told you not to).   Take Tylenol (acetaminophen) for fever or pain. If you have liver or kidney problems, ask your family doctor if it's okay to take Tylenol.   Adults can take either:    650 mg (two 325 mg pills) every 4 to 6 hours, or...   1,000 mg (two 500 mg pills) every 8 hours as needed.    Note: Don't take more than 3,000 mg in one day. Acetaminophen is found in many medicines (both prescribed and over-the-counter medicines). Read all labels to be sure you don't take too much.   For children, check the Tylenol bottle for the right dose. The dose is based on the child's age or weight.    If you have other health problems (like cancer, heart failure, an organ transplant or severe kidney disease): Call your specialty clinic if you don't feel better in the next 2 days.       Know when to call 911. Emergency warning signs include:    Trouble breathing or shortness of breath Pain or pressure in the chest that doesn't  go away Feeling confused like you haven't felt before, or not being able to wake up Bluish-colored lips or face.  Where can I get more information?   [facility] -- About COVID-19: [email or web address]   CDC -- What to Do If You're Sick: www.cdc.gov/coronavirus/2019-ncov/about/steps-when-sick.html   CDC -- Ending Home Isolation: www.cdc.gov/coronavirus/2019-ncov/hcp/disposition-in-home-patients.html   CDC -- Caring for Someone: www.cdc.gov/coronavirus/2019-ncov/if-you-are-sick/care-for-someone.html   [facility] -- Interim Guidance for Hospital Discharge to Home: www.[facility].[place]./diseases/coronavirus/hcp/hospdischarge.pdf   [facility] clinical trials (COVID-19 research studies): clinicalaffairs.[facility]-clinical-trials    Below are the COVID-19 hotlines at the [place] Department of Health ([facility]). Interpreters are available.    For health questions: Call 079-896-5437 or 1-560.289.5012 (7 a.m. to 7 p.m.) For questions about schools and childcare: Call 789-631-3682 or 1-961.230.9117 (7 a.m. to 7 p.m.)    COVID-19 (Coronavirus) General Information  Because there is currently no vaccine to prevent infection, the best way to protect yourself is to avoid being exposed to this virus. Common symptoms of COVID-19 include but are not limited to fever, cough, and shortness of breath. These symptoms appear 2-14 days after you are exposed to the virus that causes COVID-19. Click here for more information from the CDC on how to protect yourself.  If you are sick with COVID-19 or suspect you are infected with the virus that causes COVID-19, follow the steps here from the CDC to help prevent the disease from spreading to people in your home and community.  Click here for general information from the CDC on testing.  If you develop any of these emergency warning signs for COVID-19, get medical attention immediately:     Trouble breathing    Persistent pain or pressure in the chest    New confusion or  inability to arouse    Bluish lips or face      Call your doctor or clinic before going in. Call 911 if you have a medical emergency and notify the  you have or think you may have COVID-19.  For more detailed and up to date information on COVID-19 (Coronavirus), please visit the CDC website.   Diagnosis: Acute pharyngitis due to other specified organisms  Diagnosis ICD: J02.8

## 2020-11-08 NOTE — PATIENT INSTRUCTIONS
"Discharge Instructions for COVID-19 Patients  You have--or may have--COVID-19. Please follow the instructions listed below.   If you have a weakened immune system, discuss with your doctor any other actions you need to take.  How can I protect others?  If you have symptoms (fever, cough, body aches or trouble breathing):    Stay home and away from others (self-isolate) until:  ? At least 10 days have passed since your symptoms started, And   ? You've had no fever--and no medicine that reduces fever--for 1 full day (24 hours), And    ? Your other symptoms have resolved (gotten better).  If you don't show symptoms, but testing showed that you have COVID-19:    Stay home and away from others (self-isolate). Follow the tips under \"How do I self-isolate?\" below for 10 days (20 days if you have a weak immune system).    You don't need to be retested for COVID-19 before going back to school or work. As long as you're fever-free and feeling better, you can go back to school, work and other activities after waiting the 10 or 20 days.   How do I self-isolate?    Stay in your own room, even for meals. Use your own bathroom if you can.    Stay away from others in your home. No hugging, kissing or shaking hands. No visitors.    Don't go to work, school or anywhere else.    Clean \"high touch\" surfaces often (doorknobs, counters, handles). Use household cleaning spray or wipes. You'll find a full list of  on the EPA website: www.epa.gov/pesticide-registration/list-n-disinfectants-use-against-sars-cov-2.    Cover your mouth and nose with a mask or other face covering to avoid spreading germs.    Wash your hands and face often. Use soap and water.    Caregivers in these groups are at risk for severe illness due to COVID-19:  ? People 65 years and older  ? People who live in a nursing home or long-term care facility  ? People with chronic disease (lung, heart, cancer, diabetes, kidney, liver, immunologic)  ? People who have a " weakened immune system, including those who:    Are in cancer treatment    Take medicine that weakens the immune system, such as corticosteroids    Had a bone marrow or organ transplant    Have an immune deficiency    Have poorly controlled HIV or AIDS    Are obese (body mass index of 40 or higher)    Smoke regularly    Caregivers should wear gloves while washing dishes, handling laundry and cleaning bedrooms and bathrooms.    Use caution when washing and drying laundry: Don't shake dirty laundry and use the warmest water setting that you can.    For more tips on managing your health at home, go to www.cdc.gov/coronavirus/2019-ncov/downloads/10Things.pdf.  How can I take care of myself at home?  1. Get lots of rest. Drink extra fluids (unless a doctor has told you not to).    2. Take Tylenol (acetaminophen) for fever or pain. If you have liver or kidney problems, ask your family doctor if it's okay to take Tylenol.     Adults can take either:  ? 650 mg (two 325 mg pills) every 4 to 6 hours, or   ? 1,000 mg (two 500 mg pills) every 8 hours as needed.  ? Note: Don't take more than 3,000 mg in one day. Acetaminophen is found in many medicines (both prescribed and over-the-counter medicines). Read all labels to be sure you don't take too much.   For children, check the Tylenol bottle for the right dose. The dose is based on the child's age or weight.  3. If you have other health problems (like cancer, heart failure, an organ transplant or severe kidney disease): Call your specialty clinic if you don't feel better in the next 2 days.    4. Know when to call 911. Emergency warning signs include:  ? Trouble breathing or shortness of breath  ? Pain or pressure in the chest that doesn't go away  ? Feeling confused like you haven't felt before, or not being able to wake up  ? Bluish-colored lips or face    5. Your doctor may have prescribed a blood thinner medicine. Follow their instructions.  Where can I get more  information?    [facility] - About COVID-19: [email or web address]    CDC - What to Do If You're Sick: www.cdc.gov/coronavirus/2019-ncov/about/steps-when-sick.html    CDC - Ending Home Isolation: www.cdc.gov/coronavirus/2019-ncov/hcp/disposition-in-home-patients.html    CDC - Caring for Someone: www.cdc.gov/coronavirus/2019-ncov/if-you-are-sick/care-for-someone.html    [facility] - Interim Guidance for Hospital Discharge to Home: www.health.[facility].[place]./diseases/coronavirus/hcp/hospdischarge.pdf    [facility] clinical trials (COVID-19 research studies): [email or web address].[email or web address]-clinical-trials    Below are the COVID-19 hotlines at the [place] Department of Health ([facility]). Interpreters are available.  ? For health questions: Call 393-744-7132 or 1-875.203.3821 (7 a.m. to 7 p.m.)  ? For questions about schools and childcare: Call 714-974-5344 or 1-626.254.4331 (7 a.m. to 7 p.m.)    For informational purposes only. Not to replace the advice of your health care provider. Clinically reviewed by the Infection Prevention Team. Copyright   2020 [employer]. All rights reserved. [employer] [id number] - REV 08/04/20.

## 2020-11-08 NOTE — PROGRESS NOTES
SUBJECTIVE: Romy Schwartz is a 74 year old female presenting with a chief complaint of cough  and sore throat.  Onset of symptoms was 1 day(s) ago.  Course of illness is same.    Current and Associated symptoms: none  Predisposing factors include seasonal allergies.    Past Medical History:   Diagnosis Date     Arthritis      Normal delivery 1966    no complications with pregnancy or delivery     Urinary incontinence      Allergies   Allergen Reactions     No Known Drug Allergies      Social History     Tobacco Use     Smoking status: Never Smoker     Smokeless tobacco: Never Used   Substance Use Topics     Alcohol use: Yes     Comment: occasional       ROS:  SKIN: no rash  GI: no vomiting    OBJECTIVE:  Pulse 80   Temp 98.2  F (36.8  C)   Resp 16   LMP 03/20/1995   SpO2 99% GENERAL APPEARANCE: healthy, alert and no distress  EYES: EOMI,  PERRL, conjunctiva clear  HENT: ear canals and TM's normal.  Nose and mouth without ulcers, erythema or lesions  RESP: lungs clear to auscultation - no rales, rhonchi or wheezes  SKIN: no suspicious lesions or rashes      ICD-10-CM    1. Contact with or exposure to viral disease  Z20.828 Streptococcus A Rapid Scr w Reflx to PCR     Symptomatic COVID-19 Virus (Coronavirus) by PCR     Group A Streptococcus PCR Throat Swab     Quarantine  PPE used  No AGP  Pt masked  OTC meds  Fluids/Rest, f/u if worse/not any better

## 2020-11-09 LAB
SARS-COV-2 RNA SPEC QL NAA+PROBE: ABNORMAL
SPECIMEN SOURCE: ABNORMAL
SPECIMEN SOURCE: NORMAL
STREP GROUP A PCR: NOT DETECTED

## 2020-12-11 DIAGNOSIS — M81.0 OSTEOPOROSIS, UNSPECIFIED OSTEOPOROSIS TYPE, UNSPECIFIED PATHOLOGICAL FRACTURE PRESENCE: ICD-10-CM

## 2020-12-11 RX ORDER — ALENDRONATE SODIUM 70 MG/1
70 TABLET ORAL
Qty: 12 TABLET | Refills: 1 | Status: SHIPPED | OUTPATIENT
Start: 2020-12-11 | End: 2023-04-27

## 2020-12-11 NOTE — TELEPHONE ENCOUNTER
Fosamax    Routing refill request to provider for review/approval because:  No dexa on file    Ilene SOTELON, RN, PHN

## 2021-02-15 ENCOUNTER — OFFICE VISIT (OUTPATIENT)
Dept: INTERNAL MEDICINE | Facility: CLINIC | Age: 76
End: 2021-02-15
Payer: COMMERCIAL

## 2021-02-15 VITALS
DIASTOLIC BLOOD PRESSURE: 76 MMHG | HEART RATE: 61 BPM | OXYGEN SATURATION: 99 % | WEIGHT: 140.4 LBS | HEIGHT: 66 IN | BODY MASS INDEX: 22.56 KG/M2 | RESPIRATION RATE: 16 BRPM | SYSTOLIC BLOOD PRESSURE: 130 MMHG

## 2021-02-15 DIAGNOSIS — Z12.11 COLON CANCER SCREENING: ICD-10-CM

## 2021-02-15 DIAGNOSIS — Z00.00 ENCOUNTER FOR MEDICARE ANNUAL WELLNESS EXAM: Primary | ICD-10-CM

## 2021-02-15 DIAGNOSIS — Z12.31 VISIT FOR SCREENING MAMMOGRAM: ICD-10-CM

## 2021-02-15 DIAGNOSIS — M81.0 OSTEOPOROSIS, UNSPECIFIED OSTEOPOROSIS TYPE, UNSPECIFIED PATHOLOGICAL FRACTURE PRESENCE: ICD-10-CM

## 2021-02-15 DIAGNOSIS — Z13.6 CARDIOVASCULAR SCREENING; LDL GOAL LESS THAN 160: ICD-10-CM

## 2021-02-15 DIAGNOSIS — Z11.59 ENCOUNTER FOR HCV SCREENING TEST FOR LOW RISK PATIENT: ICD-10-CM

## 2021-02-15 LAB
ALBUMIN SERPL-MCNC: 4.1 G/DL (ref 3.4–5)
ALP SERPL-CCNC: 69 U/L (ref 40–150)
ALT SERPL W P-5'-P-CCNC: 34 U/L (ref 0–50)
ANION GAP SERPL CALCULATED.3IONS-SCNC: <1 MMOL/L (ref 3–14)
AST SERPL W P-5'-P-CCNC: 25 U/L (ref 0–45)
BILIRUB SERPL-MCNC: 0.4 MG/DL (ref 0.2–1.3)
BUN SERPL-MCNC: 13 MG/DL (ref 7–30)
CALCIUM SERPL-MCNC: 9.8 MG/DL (ref 8.5–10.1)
CHLORIDE SERPL-SCNC: 105 MMOL/L (ref 94–109)
CHOLEST SERPL-MCNC: 244 MG/DL
CO2 SERPL-SCNC: 30 MMOL/L (ref 20–32)
CREAT SERPL-MCNC: 0.7 MG/DL (ref 0.52–1.04)
GFR SERPL CREATININE-BSD FRML MDRD: 84 ML/MIN/{1.73_M2}
GLUCOSE SERPL-MCNC: 90 MG/DL (ref 70–99)
HDLC SERPL-MCNC: 86 MG/DL
HGB BLD-MCNC: 13.5 G/DL (ref 11.7–15.7)
LDLC SERPL CALC-MCNC: 137 MG/DL
NONHDLC SERPL-MCNC: 158 MG/DL
POTASSIUM SERPL-SCNC: 4.6 MMOL/L (ref 3.4–5.3)
PROT SERPL-MCNC: 8 G/DL (ref 6.8–8.8)
SODIUM SERPL-SCNC: 135 MMOL/L (ref 133–144)
TRIGL SERPL-MCNC: 105 MG/DL

## 2021-02-15 PROCEDURE — 86803 HEPATITIS C AB TEST: CPT | Performed by: INTERNAL MEDICINE

## 2021-02-15 PROCEDURE — 36415 COLL VENOUS BLD VENIPUNCTURE: CPT | Performed by: INTERNAL MEDICINE

## 2021-02-15 PROCEDURE — 85018 HEMOGLOBIN: CPT | Performed by: INTERNAL MEDICINE

## 2021-02-15 PROCEDURE — 99397 PER PM REEVAL EST PAT 65+ YR: CPT | Performed by: INTERNAL MEDICINE

## 2021-02-15 PROCEDURE — 80061 LIPID PANEL: CPT | Performed by: INTERNAL MEDICINE

## 2021-02-15 PROCEDURE — 80053 COMPREHEN METABOLIC PANEL: CPT | Performed by: INTERNAL MEDICINE

## 2021-02-15 RX ORDER — BUSPIRONE HYDROCHLORIDE 10 MG/1
TABLET ORAL
COMMUNITY
Start: 2021-02-03 | End: 2023-04-27

## 2021-02-15 RX ORDER — PROPRANOLOL HYDROCHLORIDE 10 MG/1
10 TABLET ORAL 3 TIMES DAILY PRN
COMMUNITY
Start: 2021-01-30

## 2021-02-15 ASSESSMENT — MIFFLIN-ST. JEOR: SCORE: 1135.66

## 2021-02-15 NOTE — PATIENT INSTRUCTIONS
Patient Education   Personalized Prevention Plan  You are due for the preventive services outlined below.  Your care team is available to assist you in scheduling these services.  If you have already completed any of these items, please share that information with your care team to update in your medical record.  Health Maintenance Due   Topic Date Due     COVID-19 Vaccine (1 of 2) 11/23/1960     Hepatitis C Screening  11/23/1962     PHQ-2  01/01/2021     Annual Wellness Visit  02/17/2021     FALL RISK ASSESSMENT  02/17/2021        Patient Education   Personalized Prevention Plan  You are due for the preventive services outlined below.  Your care team is available to assist you in scheduling these services.  If you have already completed any of these items, please share that information with your care team to update in your medical record.  Health Maintenance Due   Topic Date Due     COVID-19 Vaccine (1 of 2) 11/23/1960     Hepatitis C Screening  11/23/1962     PHQ-2  01/01/2021     Annual Wellness Visit  02/17/2021     FALL RISK ASSESSMENT  02/17/2021

## 2021-02-15 NOTE — PROGRESS NOTES
"  SUBJECTIVE:   Romy Schwartz is a 75 year old female who presents for Preventive Visit.    Patient has been advised of split billing requirements and indicates understanding: Yes  Are you in the first 12 months of your Medicare Part B coverage?  No    Physical Health:    In general, how would you rate your overall physical health? good    Outside of work, how many days during the week do you exercise? 2-3 days/week    Outside of work, approximately how many minutes a day do you exercise?greater than 60 minutes    If you drink alcohol do you typically have >3 drinks per day or >7 drinks per week? No    Do you usually eat at least 4 servings of fruit and vegetables a day, include whole grains & fiber and avoid regularly eating high fat or \"junk\" foods? Yes    Do you have any problems taking medications regularly?  No    Do you have any side effects from medications? not applicable    Needs assistance for the following daily activities: no assistance needed    Which of the following safety concerns are present in your home?  none identified     Hearing impairment: No    In the past 6 months, have you been bothered by leaking of urine? For years    Mental Health:    In general, how would you rate your overall mental or emotional health? good  PHQ-2 Score:      Do you feel safe in your environment? Yes    Have you ever done Advance Care Planning? (For example, a Health Directive, POLST, or a discussion with a medical provider or your loved ones about your wishes): Yes, advance care planning is on file.    Additional concerns to address?  No    Fall risk:  Fallen 2 or more times in the past year?: No  Any fall with injury in the past year?: No  Cognitive Screenin) Repeat 3 items (Leader, Season, Table)    2) Clock draw: NORMAL  3) 3 item recall: Recalls 3 objects  Results: 3 items recalled: COGNITIVE IMPAIRMENT LESS LIKELY    Mini-CogTM Copyright TK Dey. Licensed by the author for use in [facility] " Services; reprinted with permission ([email or web address]@.[email or web address]). All rights reserved.      Do you have sleep apnea, excessive snoring or daytime drowsiness?: no    Reviewed and updated as needed this visit by clinical staff                 Reviewed and updated as needed this visit by Provider                Social History     Tobacco Use     Smoking status: Never Smoker     Smokeless tobacco: Never Used   Substance Use Topics     Alcohol use: Yes     Comment: occasional                           Current providers sharing in care for this patient include:   Patient Care Team:  Mick Mendenhall MD as PCP - General (Internal Medicine)  Mick Mendenhall MD as Assigned PCP    The following health maintenance items are reviewed in Epic and correct as of today:  Health Maintenance   Topic Date Due     COVID-19 Vaccine (1 of 2) 11/23/1960     HEPATITIS C SCREENING  11/23/1962     PHQ-2  01/01/2021     MEDICARE ANNUAL WELLNESS VISIT  02/17/2021     FALL RISK ASSESSMENT  02/17/2021     ZOSTER IMMUNIZATION (3 of 3) 03/17/2021     ADVANCE CARE PLANNING  01/16/2023     LIPID  02/17/2025     COLORECTAL CANCER SCREENING  05/06/2025     DTAP/TDAP/TD IMMUNIZATION (3 - Td) 01/10/2027     DEXA  03/21/2033     INFLUENZA VACCINE  Completed     Pneumococcal Vaccine: 65+ Years  Completed     Pneumococcal Vaccine: Pediatrics (0 to 5 Years) and At-Risk Patients (6 to 64 Years)  Aged Out     IPV IMMUNIZATION  Aged Out     MENINGITIS IMMUNIZATION  Aged Out     HEPATITIS B IMMUNIZATION  Aged Out       Immunization History   Administered Date(s) Administered     Influenza (High Dose) 3 valent vaccine 10/17/2014, 12/15/2015, 10/20/2016, 11/15/2017, 11/07/2018     Influenza (IIV3) PF 11/02/1999, 11/20/2010, 11/13/2012     Influenza Vaccine IM > 6 months Valent IIV4 11/12/2013, 10/31/2019     Influenza, Quad, High Dose, Pf, 65yr + 11/02/2020     Pneumo Conj 13-V (2010&after) 12/15/2015     Pneumococcal 23 valent 06/08/2010     TD (ADULT, 7+) 01/10/2017     TDAP  "Vaccine (Adacel) 07/05/2006     Zoster vaccine recombinant adjuvanted (SHINGRIX) 01/20/2021     Zoster vaccine, live 05/16/2012       ROS:  CONSTITUTIONAL: NEGATIVE for fever, chills, change in weight  EYES: NEGATIVE for vision changes or irritation  ENT/MOUTH: NEGATIVE for ear, mouth and throat problems  RESP: NEGATIVE for significant cough or SOB  CV: NEGATIVE for chest pain, palpitations or peripheral edema  GI: NEGATIVE for nausea, abdominal pain, heartburn, or change in bowel habits  : NEGATIVE for frequency, dysuria, or hematuria  MUSCULOSKELETAL: NEGATIVE for significant arthralgias or myalgia, though she occasionally gets some muscle cramps in her outer calf area when laying down at night  NEURO: NEGATIVE for weakness, dizziness or paresthesias  HEME: NEGATIVE for bleeding problems  PSYCHIATRIC: NEGATIVE for changes in mood or affect    OBJECTIVE:   /76   Pulse 61   Resp 16   Ht 1.664 m (5' 5.5\")   Wt 63.7 kg (140 lb 6.4 oz)   LMP 03/20/1995   SpO2 99%   BMI 23.01 kg/m   Estimated body mass index is 23.01 kg/m  as calculated from the following:    Height as of this encounter: 1.664 m (5' 5.5\").    Weight as of this encounter: 63.7 kg (140 lb 6.4 oz).     EXAM:   GENERAL: alert and no distress  EYES: Eyes grossly normal to inspection, PERRL and conjunctivae and sclerae normal  HENT: ear canals and TM's normal, nose and mouth without ulcers or lesions  NECK: no adenopathy, no asymmetry, masses, or scars and thyroid normal to palpation  RESP: lungs clear to auscultation - no rales, rhonchi or wheezes  CV: regular rate and rhythm, normal S1 S2, no S3 or S4, no click or rub, no peripheral edema and peripheral pulses strong  MS: no gross musculoskeletal defects noted  NEURO: No focal changes  PSYCH: mentation appears normal, affect normal/bright      ASSESSMENT / PLAN:   1. Encounter for Medicare annual wellness exam  Discussed obtaining second shingles vaccination as well as Covid vaccine.  - " "Hemoglobin  - Comprehensive metabolic panel    2. CARDIOVASCULAR SCREENING; LDL GOAL LESS THAN 160  Labs ordered as fasting per routine.  - Lipid Profile    3. Visit for screening mammogram  Mammogram due in July of this upcoming year, patient will schedule    4. Osteoporosis, unspecified osteoporosis type, unspecified pathological fracture presence  Continuing with Fosamax as ordered, recommend repeat bone density scan, continue with calcium supplementation.  - DX Hip/Pelvis/Spine; Future    5. Colon cancer screening  Prior colonoscopy reviewed, recommend FIT testing.  - Fecal colorectal cancer screen (FIT); Future    6. Encounter for HCV screening test for low risk patient  Ordered as routine screening based on age.  - Hepatitis C antibody    Patient has been advised of split billing requirements and indicates understanding: Yes    COUNSELING:  Reviewed preventive health counseling, as reflected in patient instructions       Regular exercise       Healthy diet/nutrition    Estimated body mass index is 22.29 kg/m  as calculated from the following:    Height as of 2/17/20: 1.664 m (5' 5.5\").    Weight as of 2/17/20: 61.7 kg (136 lb).      She reports that she has never smoked. She has never used smokeless tobacco.    Appropriate preventive services were discussed with this patient, including applicable screening as appropriate for cardiovascular disease, diabetes, osteopenia/osteoporosis, and glaucoma.  As appropriate for age/gender, discussed screening for colorectal cancer, prostate cancer, breast cancer, and cervical cancer. Checklist reviewing preventive services available has been given to the patient.    Reviewed patients plan of care and provided an AVS. The Basic Care Plan (routine screening as documented in Health Maintenance) for Romy meets the Care Plan requirement. This Care Plan has been established and reviewed with the Patient.    Counseling Resources:  ATP IV Guidelines  Pooled Cohorts Equation " Calculator  Breast Cancer Risk Calculator  BRCA-Related Cancer Risk Assessment: FHS-7 Tool  FRAX Risk Assessment  ICSI Preventive Guidelines  Dietary Guidelines for Americans, 2010  USDA's MyPlate  ASA Prophylaxis  Lung CA Screening    Mick Mendenhall MD  [facility]

## 2021-02-16 DIAGNOSIS — Z12.11 COLON CANCER SCREENING: ICD-10-CM

## 2021-02-16 LAB
HCV AB SERPL QL IA: NONREACTIVE
HEMOCCULT STL QL IA: NEGATIVE

## 2021-02-16 PROCEDURE — 82274 ASSAY TEST FOR BLOOD FECAL: CPT | Performed by: INTERNAL MEDICINE

## 2021-03-09 ENCOUNTER — IMMUNIZATION (OUTPATIENT)
Dept: NURSING | Facility: CLINIC | Age: 76
End: 2021-03-09
Payer: COMMERCIAL

## 2021-03-09 PROCEDURE — 91303 PR COVID VAC JANSSEN AD26 0.5ML: CPT

## 2021-03-09 PROCEDURE — 0031A PR COVID VAC JANSSEN AD26 0.5ML: CPT

## 2021-03-24 ENCOUNTER — ANCILLARY PROCEDURE (OUTPATIENT)
Dept: BONE DENSITY | Facility: CLINIC | Age: 76
End: 2021-03-24
Attending: INTERNAL MEDICINE
Payer: COMMERCIAL

## 2021-03-24 DIAGNOSIS — M81.0 OSTEOPOROSIS, UNSPECIFIED OSTEOPOROSIS TYPE, UNSPECIFIED PATHOLOGICAL FRACTURE PRESENCE: ICD-10-CM

## 2021-03-24 PROCEDURE — 77085 DXA BONE DENSITY AXL VRT FX: CPT | Performed by: INTERNAL MEDICINE

## 2021-04-20 ENCOUNTER — TRANSFERRED RECORDS (OUTPATIENT)
Dept: HEALTH INFORMATION MANAGEMENT | Facility: CLINIC | Age: 76
End: 2021-04-20

## 2021-05-05 ENCOUNTER — TELEPHONE (OUTPATIENT)
Dept: INTERNAL MEDICINE | Facility: CLINIC | Age: 76
End: 2021-05-05

## 2021-05-05 DIAGNOSIS — Z12.31 VISIT FOR SCREENING MAMMOGRAM: Primary | ICD-10-CM

## 2021-05-05 NOTE — TELEPHONE ENCOUNTER
"Patient called the clinic to schedule a mammogram, no order in place, provider to order. Per last OV:   \"Mammogram due in July of this upcoming year, patient will schedule.\" Last one was done on 7/8/20 and the recommendation was annual mammography.      "

## 2021-05-05 NOTE — TELEPHONE ENCOUNTER
Patient informed but she just wants orders now so she can schedule for July. Denies any symptoms, says its just for annual mammogram. Please advise. Do you want 3D ordered again?

## 2021-05-17 ENCOUNTER — TRANSFERRED RECORDS (OUTPATIENT)
Dept: HEALTH INFORMATION MANAGEMENT | Facility: CLINIC | Age: 76
End: 2021-05-17

## 2021-07-13 ENCOUNTER — ANCILLARY PROCEDURE (OUTPATIENT)
Dept: MAMMOGRAPHY | Facility: CLINIC | Age: 76
End: 2021-07-13
Attending: INTERNAL MEDICINE
Payer: COMMERCIAL

## 2021-07-13 DIAGNOSIS — Z12.31 VISIT FOR SCREENING MAMMOGRAM: ICD-10-CM

## 2021-07-13 PROCEDURE — 77067 SCR MAMMO BI INCL CAD: CPT | Mod: TC | Performed by: RADIOLOGY

## 2021-07-13 PROCEDURE — 77063 BREAST TOMOSYNTHESIS BI: CPT | Mod: TC | Performed by: RADIOLOGY

## 2021-09-04 ENCOUNTER — HEALTH MAINTENANCE LETTER (OUTPATIENT)
Age: 76
End: 2021-09-04

## 2021-12-02 ENCOUNTER — TRANSFERRED RECORDS (OUTPATIENT)
Dept: HEALTH INFORMATION MANAGEMENT | Facility: CLINIC | Age: 76
End: 2021-12-02
Payer: COMMERCIAL

## 2022-04-16 ENCOUNTER — HEALTH MAINTENANCE LETTER (OUTPATIENT)
Age: 77
End: 2022-04-16

## 2022-10-22 ENCOUNTER — HEALTH MAINTENANCE LETTER (OUTPATIENT)
Age: 77
End: 2022-10-22

## 2022-10-24 ENCOUNTER — TRANSFERRED RECORDS (OUTPATIENT)
Dept: HEALTH INFORMATION MANAGEMENT | Facility: CLINIC | Age: 77
End: 2022-10-24

## 2023-04-27 ENCOUNTER — APPOINTMENT (OUTPATIENT)
Dept: GENERAL RADIOLOGY | Facility: CLINIC | Age: 78
End: 2023-04-27
Attending: EMERGENCY MEDICINE
Payer: COMMERCIAL

## 2023-04-27 ENCOUNTER — APPOINTMENT (OUTPATIENT)
Dept: OCCUPATIONAL THERAPY | Facility: CLINIC | Age: 78
End: 2023-04-27
Attending: INTERNAL MEDICINE
Payer: COMMERCIAL

## 2023-04-27 ENCOUNTER — HOSPITAL ENCOUNTER (OUTPATIENT)
Facility: CLINIC | Age: 78
Setting detail: OBSERVATION
Discharge: NURSING, REHAB OR LONG-TERM CARE | End: 2023-04-28
Attending: EMERGENCY MEDICINE | Admitting: INTERNAL MEDICINE
Payer: COMMERCIAL

## 2023-04-27 DIAGNOSIS — S82.045A CLOSED NONDISPLACED COMMINUTED FRACTURE OF LEFT PATELLA, INITIAL ENCOUNTER: ICD-10-CM

## 2023-04-27 DIAGNOSIS — S52.124A CLOSED NONDISPLACED FRACTURE OF HEAD OF RIGHT RADIUS, INITIAL ENCOUNTER: ICD-10-CM

## 2023-04-27 PROCEDURE — 96361 HYDRATE IV INFUSION ADD-ON: CPT

## 2023-04-27 PROCEDURE — G0378 HOSPITAL OBSERVATION PER HR: HCPCS

## 2023-04-27 PROCEDURE — 96374 THER/PROPH/DIAG INJ IV PUSH: CPT

## 2023-04-27 PROCEDURE — 250N000011 HC RX IP 250 OP 636: Performed by: EMERGENCY MEDICINE

## 2023-04-27 PROCEDURE — 99285 EMERGENCY DEPT VISIT HI MDM: CPT | Mod: 25

## 2023-04-27 PROCEDURE — 250N000013 HC RX MED GY IP 250 OP 250 PS 637: Performed by: STUDENT IN AN ORGANIZED HEALTH CARE EDUCATION/TRAINING PROGRAM

## 2023-04-27 PROCEDURE — 250N000013 HC RX MED GY IP 250 OP 250 PS 637: Performed by: INTERNAL MEDICINE

## 2023-04-27 PROCEDURE — 73080 X-RAY EXAM OF ELBOW: CPT | Mod: RT

## 2023-04-27 PROCEDURE — 99222 1ST HOSP IP/OBS MODERATE 55: CPT | Performed by: INTERNAL MEDICINE

## 2023-04-27 PROCEDURE — 99207 PR NOT IN PERSON INPATIENT CONSULT STATISTICAL MARKER: CPT | Performed by: HOSPITALIST

## 2023-04-27 PROCEDURE — 97165 OT EVAL LOW COMPLEX 30 MIN: CPT | Mod: GO

## 2023-04-27 PROCEDURE — 73562 X-RAY EXAM OF KNEE 3: CPT | Mod: LT

## 2023-04-27 PROCEDURE — 97530 THERAPEUTIC ACTIVITIES: CPT | Mod: GO

## 2023-04-27 PROCEDURE — 258N000003 HC RX IP 258 OP 636: Performed by: INTERNAL MEDICINE

## 2023-04-27 RX ORDER — LANOLIN ALCOHOL/MO/W.PET/CERES
3 CREAM (GRAM) TOPICAL
Status: DISCONTINUED | OUTPATIENT
Start: 2023-04-27 | End: 2023-04-28 | Stop reason: HOSPADM

## 2023-04-27 RX ORDER — AMOXICILLIN 250 MG
2 CAPSULE ORAL 2 TIMES DAILY PRN
Status: DISCONTINUED | OUTPATIENT
Start: 2023-04-27 | End: 2023-04-28 | Stop reason: HOSPADM

## 2023-04-27 RX ORDER — MULTIPLE VITAMINS W/ MINERALS TAB 9MG-400MCG
1 TAB ORAL DAILY
COMMUNITY

## 2023-04-27 RX ORDER — NALOXONE HYDROCHLORIDE 0.4 MG/ML
0.2 INJECTION, SOLUTION INTRAMUSCULAR; INTRAVENOUS; SUBCUTANEOUS
Status: DISCONTINUED | OUTPATIENT
Start: 2023-04-27 | End: 2023-04-28 | Stop reason: HOSPADM

## 2023-04-27 RX ORDER — OXYCODONE HYDROCHLORIDE 5 MG/1
5 TABLET ORAL EVERY 4 HOURS PRN
Status: DISCONTINUED | OUTPATIENT
Start: 2023-04-27 | End: 2023-04-28 | Stop reason: HOSPADM

## 2023-04-27 RX ORDER — SODIUM CHLORIDE, SODIUM LACTATE, POTASSIUM CHLORIDE, CALCIUM CHLORIDE 600; 310; 30; 20 MG/100ML; MG/100ML; MG/100ML; MG/100ML
INJECTION, SOLUTION INTRAVENOUS CONTINUOUS
Status: DISCONTINUED | OUTPATIENT
Start: 2023-04-27 | End: 2023-04-28 | Stop reason: HOSPADM

## 2023-04-27 RX ORDER — POLYETHYLENE GLYCOL 3350 17 G/17G
17 POWDER, FOR SOLUTION ORAL DAILY PRN
Status: DISCONTINUED | OUTPATIENT
Start: 2023-04-27 | End: 2023-04-28 | Stop reason: HOSPADM

## 2023-04-27 RX ORDER — ONDANSETRON 2 MG/ML
4 INJECTION INTRAMUSCULAR; INTRAVENOUS EVERY 6 HOURS PRN
Status: DISCONTINUED | OUTPATIENT
Start: 2023-04-27 | End: 2023-04-28 | Stop reason: HOSPADM

## 2023-04-27 RX ORDER — CHLORAL HYDRATE 500 MG
1 CAPSULE ORAL DAILY
COMMUNITY

## 2023-04-27 RX ORDER — NALOXONE HYDROCHLORIDE 0.4 MG/ML
0.4 INJECTION, SOLUTION INTRAMUSCULAR; INTRAVENOUS; SUBCUTANEOUS
Status: DISCONTINUED | OUTPATIENT
Start: 2023-04-27 | End: 2023-04-28 | Stop reason: HOSPADM

## 2023-04-27 RX ORDER — IBUPROFEN 200 MG
200-400 TABLET ORAL EVERY 6 HOURS PRN
Status: DISCONTINUED | OUTPATIENT
Start: 2023-04-27 | End: 2023-04-28 | Stop reason: HOSPADM

## 2023-04-27 RX ORDER — HYDROMORPHONE HYDROCHLORIDE 1 MG/ML
0.3 INJECTION, SOLUTION INTRAMUSCULAR; INTRAVENOUS; SUBCUTANEOUS
Status: DISCONTINUED | OUTPATIENT
Start: 2023-04-27 | End: 2023-04-28 | Stop reason: HOSPADM

## 2023-04-27 RX ORDER — BISACODYL 10 MG
10 SUPPOSITORY, RECTAL RECTAL DAILY PRN
Status: DISCONTINUED | OUTPATIENT
Start: 2023-04-27 | End: 2023-04-28 | Stop reason: HOSPADM

## 2023-04-27 RX ORDER — MORPHINE SULFATE 4 MG/ML
4 INJECTION, SOLUTION INTRAMUSCULAR; INTRAVENOUS
Status: DISCONTINUED | OUTPATIENT
Start: 2023-04-27 | End: 2023-04-27

## 2023-04-27 RX ORDER — ACETAMINOPHEN 325 MG/1
975 TABLET ORAL EVERY 8 HOURS
Status: DISCONTINUED | OUTPATIENT
Start: 2023-04-27 | End: 2023-04-28 | Stop reason: HOSPADM

## 2023-04-27 RX ORDER — AMOXICILLIN 250 MG
1 CAPSULE ORAL 2 TIMES DAILY PRN
Status: DISCONTINUED | OUTPATIENT
Start: 2023-04-27 | End: 2023-04-28 | Stop reason: HOSPADM

## 2023-04-27 RX ORDER — MULTIVIT-MIN/IRON/FOLIC ACID/K 18-600-40
500 CAPSULE ORAL DAILY
COMMUNITY

## 2023-04-27 RX ORDER — PROPRANOLOL HYDROCHLORIDE 10 MG/1
10 TABLET ORAL 3 TIMES DAILY PRN
Status: DISCONTINUED | OUTPATIENT
Start: 2023-04-27 | End: 2023-04-28 | Stop reason: HOSPADM

## 2023-04-27 RX ORDER — ONDANSETRON 4 MG/1
4 TABLET, ORALLY DISINTEGRATING ORAL EVERY 6 HOURS PRN
Status: DISCONTINUED | OUTPATIENT
Start: 2023-04-27 | End: 2023-04-28 | Stop reason: HOSPADM

## 2023-04-27 RX ADMIN — ACETAMINOPHEN 975 MG: 325 TABLET ORAL at 08:37

## 2023-04-27 RX ADMIN — PROPRANOLOL HYDROCHLORIDE 10 MG: 10 TABLET ORAL at 21:47

## 2023-04-27 RX ADMIN — SODIUM CHLORIDE, POTASSIUM CHLORIDE, SODIUM LACTATE AND CALCIUM CHLORIDE: 600; 310; 30; 20 INJECTION, SOLUTION INTRAVENOUS at 08:38

## 2023-04-27 RX ADMIN — BISACODYL 10 MG: 10 SUPPOSITORY RECTAL at 13:21

## 2023-04-27 RX ADMIN — MORPHINE SULFATE 4 MG: 4 INJECTION, SOLUTION INTRAMUSCULAR; INTRAVENOUS at 03:31

## 2023-04-27 RX ADMIN — ACETAMINOPHEN 975 MG: 325 TABLET ORAL at 16:19

## 2023-04-27 ASSESSMENT — ACTIVITIES OF DAILY LIVING (ADL)
PREVIOUS_RESPONSIBILITIES: MEAL PREP;HOUSEKEEPING;LAUNDRY;SHOPPING;MEDICATION MANAGEMENT;FINANCES;DRIVING
ADLS_ACUITY_SCORE: 40
ADLS_ACUITY_SCORE: 35
ADLS_ACUITY_SCORE: 38
ADLS_ACUITY_SCORE: 40
ADLS_ACUITY_SCORE: 40
ADLS_ACUITY_SCORE: 35
ADLS_ACUITY_SCORE: 35
ADLS_ACUITY_SCORE: 40
ADLS_ACUITY_SCORE: 37
ADLS_ACUITY_SCORE: 35
ADLS_ACUITY_SCORE: 35
ADLS_ACUITY_SCORE: 40

## 2023-04-27 ASSESSMENT — ENCOUNTER SYMPTOMS
ABDOMINAL PAIN: 0
NECK PAIN: 0
BACK PAIN: 0
HEADACHES: 0
DIZZINESS: 0

## 2023-04-27 NOTE — CONSULTS
Consult Date: 04/27/2023    CHIEF COMPLAINT:     1.  Right elbow injury.  2.  Left knee injury.    HISTORY OF PRESENT ILLNESS:  The patient is a 77-year-old female who is several years status post ORIF of a complex fracture including the distal humerus , but also including ulnar fixation.  This was done by Dr. Yusuf Hedrick.  She states that she has had low-grade stiffness, but generally has had a good result from the surgery.  She slipped and fell earlier today, sustaining injuries to the left knee and reinjury to the right elbow.  She has no other complaints or problems.    PAST MEDICAL HISTORY:  Noncontributory.    PHYSICAL EXAMINATION:  Exam of the elbow shows tenderness about both the proximal radius and ulna where there is slight soft tissue swelling.  She appears to have very little pain with forearm rotation.  There is a small elbow effusion.    Knee exam shows an intact leg lift with intact skin over the anterior knee.    X-rays are reviewed including 3 views of the elbow and 3 views of the knee.  Fractures are noted.  There is a lucency and slight irregularity of the proximal ulna adjacent to the plate.    IMPRESSION:    1.  Right elbow injury with radial head fracture and probable fracture, periprosthetic, adjacent to the plate and the proximal ulna.  2.  Left patella fracture, nondisplaced.    RECOMMENDATION:  I reviewed that she has 2 acute injuries and both are discussed.  For the elbow, long arm splint is recommended.  She should not bear weight and she should use a sling.  She should follow up in 7-10 days with 2 views of the elbow and possible casting versus splinting at that time.    For her knee, I reviewed that she likely will heal her fracture in 6-8 weeks.  She may weightbear as tolerated in an immobilizer and she describes a history of a contralateral similar injury.  She will follow up for 2 views of the knee as an outpatient as well.  Risks and benefits of surgical treatment are discussed  including hardware removal at the ulna with extension of her plate.  I also reviewed what the consequences of patella fracture displacement including ORIF at that location as well.    Lonnie Barros MD        D: 2023   T: 2023   MT: cielo    Name:     TEJINDER NUÑEZ  MRN:      [id number]        Account:      [id number]   :      1945           Consult Date: 2023     Document: [id number]

## 2023-04-27 NOTE — ED TRIAGE NOTES
Pt arrives via EMS after a fall yesterday, pt states pain in right elbow and left knee. States she tripped while walking, no thinners or LOC per pt. Prior fracture to the right elbow in 2014. Pt states she knows its broken, swelling noted to elbow. ABCs intact and AOx4.      Triage Assessment     Row Name 04/27/23 0101       Triage Assessment (Adult)    Airway WDL WDL       Respiratory WDL    Respiratory WDL WDL       Cardiac WDL    Cardiac WDL WDL

## 2023-04-27 NOTE — PROGRESS NOTES
"Assuming care.  Patient seen, interviewed, examined and chart reviewed.  Admitted after accidental fall with left patellar fracture and right radial head fracture.  Patient has been seen by orthopedic surgeon who recommends conservative treatment.  At the moment of my visit she is getting pain control with Tylenol.  Vital signs:  Temp: 97.3  F (36.3  C) Temp src: Temporal BP: (Abnormal) 157/57 Pulse: 65   Resp: 16 SpO2: 98 % O2 Device: None (Room air)        Estimated body mass index is 23.01 kg/m  as calculated from the following:    Height as of 2/15/21: 1.664 m (5' 5.5\").    Weight as of 2/15/21: 63.7 kg (140 lb 6.4 oz).      I agree with assessment and plan as outlined at by my partner Dr. Willis.  "

## 2023-04-27 NOTE — ED NOTES
[facility]  ED Nurse Handoff Report    Romy Schwartz is a 77 year old female   ED Chief complaint: Fall  . ED Diagnosis:   Final diagnoses:   None     Allergies:   Allergies   Allergen Reactions     No Known Drug Allergy        Code Status: Full Code  Activity level - Baseline/Home:  Independent. Activity Level - Current:   Stand by Assist. Lift room needed: No. Bariatric: No   Needed: No   Isolation: No. Infection: Not Applicable.     Vital Signs:   Vitals:    04/27/23 0100 04/27/23 0119   BP:  (!) 187/99   Pulse:  72   Resp: 18    Temp: 97.7  F (36.5  C)    SpO2:  100%       Cardiac Rhythm:  ,      Pain level:    Patient confused: No. Patient Falls Risk: Yes.   Elimination Status: Has voided   Patient Report - Initial Complaint: Fall. Focused Assessment: fell on 4/25, now with increased pain to right elbow and left knee.    Tests Performed: Xray. Abnormal Results: Labs Ordered and Resulted from Time of ED Arrival to Time of ED Departure - No data to display  XR Knee Left 3 Views   Final Result   IMPRESSION: Osteopenia. There is a comminuted, minimally displaced, transverse fracture of the left patella. There is significant prepatellar edema. There is a large knee joint effusion. There are tricompartmental arthritic changes. There is meniscal    chondrocalcinosis which can be seen in pseudogout.      Elbow XR, G/E 3 views, right   Final Result   IMPRESSION: Surgical plate and screws are again seen in the distal humerus and proximal ulna. No periprosthetic fractures or hardware loosening. A minimally displaced, comminuted fracture is present in the radial head with extension to the elbow joint,    which appears intact. Multiple chronic osseous fragments are again seen in the medial and lateral aspects of the elbow joint, unchanged.        .   Treatments provided: see MAR  Family Comments: n/a  OBS brochure/video discussed/provided to patient:  Yes  ED Medications:   Medications   morphine  (PF) injection 4 mg (has no administration in time range)     Drips infusing:  No  For the majority of the shift, the patient's behavior Green. Interventions performed were n/a.    Sepsis treatment initiated: No     Patient tested for COVID 19 prior to admission: NO    ED Nurse Name/Phone Number: Kelsey Nam RN,   3:20 AM    RECEIVING UNIT ED HANDOFF REVIEW    Above ED Nurse Handoff Report was reviewed: Yes  Reviewed by: Kellee Masters RN on April 27, 2023 at 11:53 AM

## 2023-04-27 NOTE — ED NOTES
Narrative: R Posterior Long Arm Splint     Indication: Right radial head fracture    Material: Ortho-Glass with an extra 5 layers of cotton padding    Procedure: Custom fit posterior long-arm splint with Ortho-Glass and extra padding was applied to the right upper extremity.  Elbow approximately 80 degrees flexion which was the position of comfort.  Loosely secured with an Ace wrap.  CMS intact post procedure.  No complications.me2      Riley Becker MD  [facility]  [facility]       Riley Becker MD  04/27/23 [id number]

## 2023-04-27 NOTE — PLAN OF CARE
Came up to floor this afternoon. Doing well. SBA. KI LLE and sling RUE in place. Declined prn oxy. Scheduled tyl and ice for pain management. CMS intact. Feels constipated, prn supp. Voiding ok. Would like to go to a TCU as she does not have any assistance at home. SW consult placed.

## 2023-04-27 NOTE — ED PROVIDER NOTES
History     Chief Complaint:  Fall       HPI   Romy Schwartz is a 77 year old female with a history of anemia, anxiety and previous significant right humerus fracture who presents with mechanical fall with right elbow and left knee pain.  Patient reports that she tripped and fell on a loose board on a deck.  She denies hitting her head but notes that she hit her left knee and right elbow.  Denies any other injuries.  She was able to get home but noted significant difficulty walking and bearing weight on the left leg following driving home.  She denies any head trauma or loss of consciousness.    Independent Historian:   None - Patient Only    ROS:  Review of Systems   Cardiovascular: Negative for chest pain.   Gastrointestinal: Negative for abdominal pain.   Musculoskeletal: Negative for back pain and neck pain.        Right elbow and right knee pain    Neurological: Negative for dizziness and headaches.       Allergies:  No Known Drug Allergy     Medications:    alendronate (FOSAMAX) 70 MG tablet  Ascorbic Acid (VITAMIN C PO)  aspirin 81 MG tablet  busPIRone (BUSPAR) 10 MG tablet  calcium carbonate (OS-GERALDINE 500 MG [place]. [place]) 500 MG tablet  cholecalciferol (VITAMIN-D) 1000 UNIT capsule  MULTIPLE VITAMIN PO TABS  propranolol (INDERAL) 10 MG tablet        Past Medical History:    Past Medical History:   Diagnosis Date     Arthritis      Normal delivery 1966     Urinary incontinence        Past Surgical History:    Past Surgical History:   Procedure Laterality Date     HC TOOTH EXTRACTION W/FORCEP  1964    4 wisdom teeth removed     OPEN REDUCTION INTERNAL FIXATION HUMERUS DISTAL  8/5/2014    Procedure: OPEN REDUCTION INTERNAL FIXATION HUMERUS DISTAL;  Surgeon: Yusuf Hedrick MD;  Location:  OR     [facility] NONSPECIFIC PROCEDURE  1974    BTL     ZZC NONSPECIFIC PROCEDURE  12/99     [facility] SPINE FUSION,ANTER,3 SGMTS  2007    cervical spine fusion C4-5, C5-6, C6-7     [facility] COLONOSCOPY THRU STOMA, DIAGNOSTIC  2/24/05     "normal colonoscopy        Family History:    family history includes Cancer in her brother and mother; Cancer - colorectal in her maternal grandmother; Family History Negative in her brother and brother; Heart Disease in her father; Lipids in her mother.    Social History:   reports that she has never smoked. She has never used smokeless tobacco. She reports current alcohol use. She reports that she does not use drugs.  PCP: [facility], [facility]     Physical Exam     Patient Vitals for the past 24 hrs:   BP Temp Pulse Resp SpO2   04/27/23 0500 -- -- -- -- 98 %   04/27/23 0430 -- -- -- -- 94 %   04/27/23 0400 (!) 165/80 -- 67 -- 92 %   04/27/23 0345 -- -- -- -- 93 %   04/27/23 0330 (!) 181/88 -- -- -- --   04/27/23 0119 (!) 187/99 -- 72 -- 100 %   04/27/23 0100 -- 97.7  F (36.5  C) -- 18 --        Physical Exam  General: Patient is alert, awake and interactive when I enter the room  Head: The scalp, face, and head appear normal. Atraumatic.   Eyes: The pupils are equal, round, and reactive to light. Conjunctivae and sclerae are normal  ENT: No hemotympanum or signs basilar skull fracture. The oropharynx is normal without erythema. No tenderness to palpation of the face, nose or jaw.   Neck: Normal range of motion. No cervical midline tenderness.   CV: Regular rate. S1/S2. No murmurs.   Resp: Lungs are clear without wheezes or rales. No distress. No crepitance.   GI: Abdomen is soft, no rigidity, guarding, or rebound. No contusion. No distension. No tenderness to palpation in any quadrant.     MS: Tenderness to the right elbow with pain with passive range of movement including supination and pronation at the right elbow.  Mild tenderness to palpation of the left knee overlying the patella.  No other pain or tenderness with major joints.  No C/T/L tenderness in midline  Skin: No rash or lesions noted. Normal capillary refill noted  Neuro: GCS 15.  CN\"s II-XII intact. Speech is normal and fluent. Face is " symmetric. Strength is normal and symmetric.   Psych: Normal affect.  Appropriate interactions.     Emergency Department Course     Imaging:  XR Knee Left 3 Views   Final Result   IMPRESSION: Osteopenia. There is a comminuted, minimally displaced, transverse fracture of the left patella. There is significant prepatellar edema. There is a large knee joint effusion. There are tricompartmental arthritic changes. There is meniscal    chondrocalcinosis which can be seen in pseudogout.      Elbow XR, G/E 3 views, right   Final Result   IMPRESSION: Surgical plate and screws are again seen in the distal humerus and proximal ulna. No periprosthetic fractures or hardware loosening. A minimally displaced, comminuted fracture is present in the radial head with extension to the elbow joint,    which appears intact. Multiple chronic osseous fragments are again seen in the medial and lateral aspects of the elbow joint, unchanged.         Report per radiology    Emergency Department Course & Assessments:    Interventions:  Medications   morphine (PF) injection 4 mg (4 mg Intravenous $Given 4/27/23 0331)        Independent Interpretation (X-rays, CTs, rhythm strip):  X-ray of the right elbow reveals postoperative hardware and right radial head fracture    Consultations/Discussion of Management or Tests:  Spoke with Dr. Willis regarding admission     Disposition:  The patient was admitted to the hospital under the care of Dr. Willis.     Impression & Plan      Medical Decision Making:  Patient is a 77-year-old female who presents emergency department with left knee and right elbow pain after mechanical fall.  Physical exam detailed above.  X-ray of the left knee reveals a minimally displaced comminuted patellar fracture.  X-ray of the right elbow reveals a right radial head fracture with postoperative hardware.  The remainder of her head to toe exam was reassuring.  Unfortunately the patient lives alone and I have concerns about  her ability to care for herself with a left knee and right elbow fracture.  Will be admitted for orthopedic consultation, pain control and possible rehab placement.    Diagnosis:    ICD-10-CM    1. Closed nondisplaced comminuted fracture of left patella, initial encounter  S82.045A Knee Supplies Order Knee Immobilizer; Left      2. Closed nondisplaced fracture of head of right radius, initial encounter  S52.124A              MD Jennifer Krueger Christopher Joseph, MD  04/27/23 0557

## 2023-04-27 NOTE — H&P
[facility] [place]    History and Physical - Hospitalist Service       Date of Admission:  4/27/2023    Assessment & Plan      Romy Schwartz is a 77 year old female admitted on 4/27/2023 with left patella fracture and right distal radius fracture after mechanical fall. She has history of incontinence, arthritis, right humerus fracture with open reduction internal fixation, C4-5 fusion, osteopenia, and muscle tension headaches.  She presented to the emergency department earlier this morning for evaluation of left knee and right wrist pain after a fall.  She was glamping (glamorous camping) in [place], [place].  She tripped on a loose wood plank on a deck and fell, landing on her left knee and right elbow.  She did not hurt immediately after the fall but after driving home she had pain in her right elbow and left knee so she came to the emergency department for evaluation.  Aside from this she reported feeling well without fever, chills, chest pain, shortness of breath, orthopnea, dyspnea on exertion, abdominal pain, nausea, vomiting, dysuria, and diarrhea.  Emergency department evaluation showed elevated blood pressure but overall stable vital signs.  No labs were obtained.  X-ray of right elbow showed radial head fracture.  X-ray of left knee showed transverse fracture of the patella.  She was unable to ambulate independently.  She lives alone.  I was asked to admit her to observation for pain control, orthopedic surgery evaluation, and assessment of mobility.    Problem list    Mechanical fall  Right radial head fracture  Left patella fracture  Osteopenia  -Admit to observation  -I have ordered scheduled Tylenol and as needed ibuprofen, oxycodone, and IV Dilaudid for pain  -Zofran is available as needed for nausea  -N.p.o. with LR at 75 mL/h until seen by surgery and a decision made about surgery  -If surgery is recommended patient seems optimized for surgery and low risk.         Diet:    NPO  DVT Prophylaxis: Low Risk/Ambulatory with no VTE prophylaxis indicated  Vazquez Catheter: Not present  Lines: None     Cardiac Monitoring: None  Code Status:   Full code    Clinically Significant Risk Factors Present on Admission                               Disposition Plan      Expected Discharge Date: 04/28/2023                  Jaun Willis MD  Hospitalist Service  [facility]  Securely message with [employer] (more info)  Text page via [facility] Paging/Directory     ______________________________________________________________________    Chief Complaint   Fall with right elbow and left knee pain    History is obtained from the patient, Dr. Rodriguez, and the medical record    History of Present Illness   Romy Schwartz is a 77 year old female admitted on 4/27/2023 with left patella fracture and right distal radius fracture after mechanical fall. She has history of incontinence, arthritis, right humerus fracture with open reduction internal fixation, C4-5 fusion, osteopenia, and muscle tension headaches.  She presented to the emergency department earlier this morning for evaluation of left knee and right wrist pain after a fall.  She was glamping (glamorous camping) in [place], [place].  She tripped on a loose wood plank on a deck and fell, landing on her left knee and right elbow.  She did not hurt immediately after the fall but after driving home she had pain in her right elbow and left knee so she came to the emergency department for evaluation.  Aside from this she reported feeling well without fever, chills, chest pain, shortness of breath, orthopnea, dyspnea on exertion, abdominal pain, nausea, vomiting, dysuria, and diarrhea.  Emergency department evaluation showed elevated blood pressure but overall stable vital signs.  No labs were obtained.  X-ray of right elbow showed radial head fracture.  X-ray of left knee showed transverse fracture of the patella.  She was unable to  ambulate independently.  She lives alone.  I was asked to admit her to observation for pain control, orthopedic surgery evaluation, and assessment of mobility.        Past Medical History    Past Medical History:   Diagnosis Date     Arthritis      Normal delivery 1966    no complications with pregnancy or delivery     Urinary incontinence        Past Surgical History   Past Surgical History:   Procedure Laterality Date     HC TOOTH EXTRACTION W/FORCEP  1964    4 wisdom teeth removed     OPEN REDUCTION INTERNAL FIXATION HUMERUS DISTAL  8/5/2014    Procedure: OPEN REDUCTION INTERNAL FIXATION HUMERUS DISTAL;  Surgeon: Yusuf Hedrick MD;  Location:  OR     [facility] NONSPECIFIC PROCEDURE  1974    [facility] NONSPECIFIC PROCEDURE  12/99     [facility] SPINE FUSION,ANTER,3 SGMTS  2007    cervical spine fusion C4-5, C5-6, C6-7     [facility] COLONOSCOPY THRU STOMA, DIAGNOSTIC  2/24/05    normal colonoscopy       Prior to Admission Medications   Prior to Admission Medications   Prescriptions Last Dose Informant Patient Reported? Taking?   Ascorbic Acid (VITAMIN C PO)   Yes No   Sig: Take by mouth daily   MULTIPLE VITAMIN PO TABS   Yes No   Sig: daily   alendronate (FOSAMAX) 70 MG tablet   No No   Sig: Take 1 tablet (70 mg) by mouth every 7 days   aspirin 81 MG tablet   Yes No   Sig: Take 1 tablet by mouth daily.   busPIRone (BUSPAR) 10 MG tablet   Yes No   Sig: Take 1 tablet(s) by oral route , 2 times per day , for 60 days   calcium carbonate (OS-GERALDINE 500 MG [place]. [place]) 500 MG tablet   Yes No   Sig: Take 500 mg by mouth 2 times daily   cholecalciferol (VITAMIN-D) 1000 UNIT capsule   Yes No   Sig: Take 1 capsule by mouth daily.   propranolol (INDERAL) 10 MG tablet   Yes No   Sig: TAKE 1 TABLET BY MOUTH EVERY DAY AS NEEDED      Facility-Administered Medications: None        Review of Systems    The 10 point Review of Systems is negative other than noted in the HPI or here.     Social History   I have reviewed this patient's social history  and updated it with pertinent information if needed.  Social History     Tobacco Use     Smoking status: Never     Smokeless tobacco: Never   Substance Use Topics     Alcohol use: Yes     Comment: occasional     Drug use: No       Family History   I have reviewed this patient's family history and updated it with pertinent information if needed.  Family History   Problem Relation Age of Onset     Heart Disease Father          age 78 also bypass surg     Lipids Mother         alive b. 192     Cancer Mother         Non Hodgkins     Cancer Brother         d. age 47 myelodysplasia from chemotherapy nonhodgkins lymphoma     Family History Negative Brother         alive b.  1934     Family History Negative Brother         alive age b. 1940     Cancer - colorectal Maternal Grandmother          colon cancer       Allergies   Allergies   Allergen Reactions     No Known Drug Allergy         Physical Exam   Vital Signs: Temp: 97.7  F (36.5  C)   BP: (!) 165/80 Pulse: 67   Resp: 18 SpO2: 98 % O2 Device: None (Room air)    Weight: 0 lbs 0 oz    GENERAL: Pleasant and cooperative. No acute distress.  EYES: Pupils equal and round. No scleral erythema or icterus.  ENT: External ears are normal without deformity. Posterior oropharynx is without erythem, swelling, or exudate.  NECK: Supple. No masses or swelling. No tenderness. Thyroid is normal without mass or tenderness.  CHEST: Clear to auscultation. Normal breath sounds. No retractions.   CV: Regular rate and rhythm. No JVD. Pulses normal.  ABDOMEN: Bowel sounds present. No tenderness. No masses or hernia.  EXTREMETIES: No clubbing, cyanosis, or ischemia. Right arm I sling.  SKIN: Warm and dry to touch. No wounds or rashes.  NEUROLOGIC: Strength and sensation are normal. Deep tendon reflexes are normal. Cranial nerves are normal.        Medical Decision Making       55 MINUTES SPENT BY ME on the date of service doing chart review, history, exam, documentation & further  activities per the note.      Data         Imaging results reviewed over the past 24 hrs:   Recent Results (from the past 24 hour(s))   Elbow XR, G/E 3 views, right    Narrative    EXAM: XR ELBOW RIGHT G/E 3 VIEWS  LOCATION: [facility]  DATE/TIME: 4/27/2023 2:04 AM CDT    INDICATION: right elbow pain after fall  COMPARISON: CT on 12/03/2014.      Impression    IMPRESSION: Surgical plate and screws are again seen in the distal humerus and proximal ulna. No periprosthetic fractures or hardware loosening. A minimally displaced, comminuted fracture is present in the radial head with extension to the elbow joint,   which appears intact. Multiple chronic osseous fragments are again seen in the medial and lateral aspects of the elbow joint, unchanged.   XR Knee Left 3 Views    Narrative    EXAM: XR KNEE LEFT 3 VIEWS  LOCATION: [facility]  DATE/TIME: 4/27/2023 2:05 AM CDT    INDICATION: left knee pain fall  COMPARISON: None.      Impression    IMPRESSION: Osteopenia. There is a comminuted, minimally displaced, transverse fracture of the left patella. There is significant prepatellar edema. There is a large knee joint effusion. There are tricompartmental arthritic changes. There is meniscal   chondrocalcinosis which can be seen in pseudogout.     No lab results found in last 7 days.

## 2023-04-27 NOTE — PROGRESS NOTES
04/27/23 1416   Appointment Info   Signing Clinician's Name / Credentials (OT) Noemí Bridges OTR/L   Quick Adds   Quick Adds Certification   Living Environment   People in Home alone   Current Living Arrangements apartment   Home Accessibility no concerns   Transportation Anticipated car, drives self;family or friend will provide   Living Environment Comments Pt lives alone in senior apartment, elevator access, tub/shower, comfort height toilet.   Self-Care   Usual Activity Tolerance excellent   Current Activity Tolerance moderate   Equipment Currently Used at Home none   Fall history within last six months yes   Number of times patient has fallen within last six months 1   Activity/Exercise/Self-Care Comment Pt reports indep in all ADLs, IADLs and mobility tasks with no AD at baseline.   Instrumental Activities of Daily Living (IADL)   Previous Responsibilities meal prep;housekeeping;laundry;shopping;medication management;finances;driving   General Information   Onset of Illness/Injury or Date of Surgery 04/27/23   Referring Physician Jaun Willis MD   Patient/Family Therapy Goal Statement (OT) Pt is motivated to d/c to rehab   Additional Occupational Profile Info/Pertinent History of Current Problem Per chart: Pt is a 77 year old female admitted after accidental fall w/ L patellar fracture and R radial head fracture; ortho recommending conservative treatment.   Existing Precautions/Restrictions fall;other (see comments)  (Knee immobilizer; sling on RUE)   Right Upper Extremity (Weight-bearing Status) non weight-bearing (NWB)   Left Lower Extremity (Weight-bearing Status) weight-bearing as tolerated (WBAT)   Cognitive Status Examination   Orientation Status orientation to person, place and time   Sensory   Sensory Quick Adds sensation intact   Pain Assessment   Patient Currently in Pain Yes, see Vital Sign flowsheet  (Pain mostly reported in RUE; some in LLE with movement)   Range of Motion Comprehensive    Comment, General Range of Motion LUE WFL; RUE NT   Strength Comprehensive (MMT)   Comment, General Manual Muscle Testing (MMT) Assessment LUE WFL; JAYLYN NT (pt is R hand dominant), overall good strength   Coordination   Upper Extremity Coordination Right UE impaired   Bed Mobility   Bed Mobility supine-sit;sit-supine   Supine-Sit [place] (Bed Mobility) contact guard   Sit-Supine [place] (Bed Mobility) contact guard   Transfers   Transfers bed-chair transfer;sit-stand transfer;toilet transfer;shower transfer   Transfer Skill: Bed to Chair/Chair to Bed   Bed-Chair [place] (Transfers) contact guard   Sit-Stand Transfer   Sit-Stand [place] (Transfers) contact guard   Shower Transfer   [place] Level (Shower Transfer) not tested   Toilet Transfer   [place] Level (Toilet Transfer) contact guard   Balance   Balance Comments Mild unsteadiness when ambulating, likely could benefit from trial of cane   Activities of Daily Living   BADL Assessment/Intervention upper body dressing;lower body dressing;grooming;toileting   Upper Body Dressing Assessment/Training   [place] Level (Upper Body Dressing) moderate assist (50% patient effort)   Lower Body Dressing Assessment/Training   [place] Level (Lower Body Dressing) moderate assist (50% patient effort)   Grooming Assessment/Training   [place] Level (Grooming) contact guard assist   Toileting   [place] Level (Toileting) contact guard assist   Clinical Impression   Criteria for Skilled Therapeutic Interventions Met (OT) Yes, treatment indicated   OT Diagnosis Impaired ADLs, IADLs and mobility tasks   OT Problem List-Impairments impacting ADL problems related to;activity tolerance impaired;balance;strength;pain   ADL comments/analysis Pt below baseline level of functioning   Assessment of Occupational Performance 5 or more Performance Deficits   Identified Performance Deficits Bathing, dressing, grooming, toileting, homemaking,  transfers   Planned Therapy Interventions (OT) ADL retraining;IADL retraining;strengthening;transfer training;home program guidelines;progressive activity/exercise   Clinical Decision Making Complexity (OT) low complexity   Risk & Benefits of therapy have been explained evaluation/treatment results reviewed;care plan/treatment goals reviewed;risks/benefits reviewed;current/potential barriers reviewed;participants voiced agreement with care plan;participants included;patient   OT Total Evaluation Time   OT Eval, Low Complexity Minutes (90287) 10   Therapy Certification   Medical Diagnosis L patellar fracture and R radial head fracture   Start of Care Date 04/27/23   Certification date from 04/27/23   Certification date to 05/04/23   OT Goals   Therapy Frequency (OT) Daily   OT Predicted Duration/Target Date for Goal Attainment 05/04/23   OT Goals Hygiene/Grooming;Upper Body Dressing;Lower Body Dressing;Toilet Transfer/Toileting;OT Goal 1   OT: Hygiene/Grooming supervision/stand-by assist;within precautions;while standing   OT: Upper Body Dressing Supervision/stand-by assist;within precautions;including orthotic   OT: Lower Body Dressing Supervision/stand-by assist;within precautions;including orthotic   OT: Toilet Transfer/Toileting Supervision/stand-by assist;toilet transfer;cleaning and garment management;using adaptive equipment   OT: Goal 1 Pt will perform tub/shower transfer with CGA in prep for safe transfer in discharge environment.   OT Discharge Planning   OT Discharge Recommendation (DC Rec) Transitional Care Facility   OT Rationale for DC Rec Pt presenting below baseline level of functioning, limited by non-use/precautions of RUE (dominant UE) and LLE immobilizer, pain. Recommend ongoing skilled OT while IP and in TCU setting to improve strength, functional activity tolerance, balance and safety needed for daily tasks. Pt is open to rehab options - limited support in home environment.   OT Brief overview  of current status CGA tfs/mobility, mod A LB/UB dsg, CGA toileting   Total Session Time   Total Session Time (sum of timed and untimed services) 10        [facility]  OUTPATIENT OCCUPATIONAL THERAPY  EVALUATION  PLAN OF TREATMENT FOR OUTPATIENT REHABILITATION  (COMPLETE FOR INITIAL CLAIMS ONLY)  Patient's Last Name, First Name, M.I.  YOB: 1945  Romy Schwartz                          Provider's Name  [facility] Medical Record No.  [id number]                             Onset Date:  04/27/23   Start of Care Date:  04/27/23   Type:     ___PT   _X_OT   ___SLP Medical Diagnosis:  L patellar fracture and R radial head fracture                    OT Diagnosis:  Impaired ADLs, IADLs and mobility tasks Visits from SOC:  1     See note for plan of treatment, functional goals and certification details    I CERTIFY THE NEED FOR THESE SERVICES FURNISHED UNDER        THIS PLAN OF TREATMENT AND WHILE UNDER MY CARE     (Physician co-signature of this document indicates review and certification of the therapy plan).

## 2023-04-28 ENCOUNTER — APPOINTMENT (OUTPATIENT)
Dept: OCCUPATIONAL THERAPY | Facility: CLINIC | Age: 78
End: 2023-04-28
Payer: COMMERCIAL

## 2023-04-28 ENCOUNTER — LAB REQUISITION (OUTPATIENT)
Dept: LAB | Facility: CLINIC | Age: 78
End: 2023-04-28
Payer: COMMERCIAL

## 2023-04-28 VITALS
SYSTOLIC BLOOD PRESSURE: 128 MMHG | OXYGEN SATURATION: 94 % | RESPIRATION RATE: 14 BRPM | DIASTOLIC BLOOD PRESSURE: 50 MMHG | TEMPERATURE: 97.1 F | HEART RATE: 78 BPM

## 2023-04-28 DIAGNOSIS — U07.1 COVID-19: ICD-10-CM

## 2023-04-28 LAB
ANION GAP SERPL CALCULATED.3IONS-SCNC: 7 MMOL/L (ref 7–15)
BUN SERPL-MCNC: 9.7 MG/DL (ref 8–23)
CALCIUM SERPL-MCNC: 9.2 MG/DL (ref 8.8–10.2)
CHLORIDE SERPL-SCNC: 105 MMOL/L (ref 98–107)
CREAT SERPL-MCNC: 0.6 MG/DL (ref 0.51–0.95)
DEPRECATED HCO3 PLAS-SCNC: 26 MMOL/L (ref 22–29)
ERYTHROCYTE [DISTWIDTH] IN BLOOD BY AUTOMATED COUNT: 13.2 % (ref 10–15)
GFR SERPL CREATININE-BSD FRML MDRD: >90 ML/MIN/1.73M2
GLUCOSE SERPL-MCNC: 93 MG/DL (ref 70–99)
HCT VFR BLD AUTO: 36 % (ref 35–47)
HGB BLD-MCNC: 11.9 G/DL (ref 11.7–15.7)
MCH RBC QN AUTO: 31.5 PG (ref 26.5–33)
MCHC RBC AUTO-ENTMCNC: 33.1 G/DL (ref 31.5–36.5)
MCV RBC AUTO: 95 FL (ref 78–100)
PLATELET # BLD AUTO: 260 10E3/UL (ref 150–450)
POTASSIUM SERPL-SCNC: 3.8 MMOL/L (ref 3.4–5.3)
RBC # BLD AUTO: 3.78 10E6/UL (ref 3.8–5.2)
SODIUM SERPL-SCNC: 138 MMOL/L (ref 136–145)
WBC # BLD AUTO: 7.4 10E3/UL (ref 4–11)

## 2023-04-28 PROCEDURE — 97535 SELF CARE MNGMENT TRAINING: CPT | Mod: GO

## 2023-04-28 PROCEDURE — 85014 HEMATOCRIT: CPT | Performed by: INTERNAL MEDICINE

## 2023-04-28 PROCEDURE — 250N000013 HC RX MED GY IP 250 OP 250 PS 637: Performed by: INTERNAL MEDICINE

## 2023-04-28 PROCEDURE — 36415 COLL VENOUS BLD VENIPUNCTURE: CPT | Performed by: INTERNAL MEDICINE

## 2023-04-28 PROCEDURE — 97530 THERAPEUTIC ACTIVITIES: CPT | Mod: GO

## 2023-04-28 PROCEDURE — G0378 HOSPITAL OBSERVATION PER HR: HCPCS

## 2023-04-28 PROCEDURE — 82310 ASSAY OF CALCIUM: CPT | Performed by: INTERNAL MEDICINE

## 2023-04-28 PROCEDURE — U0005 INFEC AGEN DETEC AMPLI PROBE: HCPCS | Performed by: NURSE PRACTITIONER

## 2023-04-28 PROCEDURE — 99239 HOSP IP/OBS DSCHRG MGMT >30: CPT | Performed by: HOSPITALIST

## 2023-04-28 RX ORDER — POLYETHYLENE GLYCOL 3350 17 G/17G
17 POWDER, FOR SOLUTION ORAL DAILY
Qty: 510 G | DISCHARGE
Start: 2023-04-28 | End: 2023-05-01

## 2023-04-28 RX ORDER — OXYCODONE HYDROCHLORIDE 5 MG/1
5 TABLET ORAL EVERY 4 HOURS PRN
Qty: 10 TABLET | Refills: 0 | Status: SHIPPED | OUTPATIENT
Start: 2023-04-28 | End: 2023-05-01

## 2023-04-28 RX ORDER — ACETAMINOPHEN 325 MG/1
975 TABLET ORAL EVERY 8 HOURS
DISCHARGE
Start: 2023-04-28

## 2023-04-28 RX ADMIN — ACETAMINOPHEN 975 MG: 325 TABLET ORAL at 09:25

## 2023-04-28 RX ADMIN — ACETAMINOPHEN 975 MG: 325 TABLET ORAL at 01:31

## 2023-04-28 ASSESSMENT — ACTIVITIES OF DAILY LIVING (ADL)
ADLS_ACUITY_SCORE: 38
ADLS_ACUITY_SCORE: 38
DEPENDENT_IADLS:: INDEPENDENT
ADLS_ACUITY_SCORE: 38

## 2023-04-28 NOTE — PLAN OF CARE
Goal Outcome Evaluation:         Patient vital signs are at baseline: Yes  Patient able to ambulate as they were prior to admission or with assist devices provided by therapies during their stay:  No,  Reason:  TCU  Patient MUST void prior to discharge:  Yes  Patient able to tolerate oral intake:  Yes  Pain has adequate pain control using Oral analgesics:  Yes  Does patient have an identified :  Yes  Has goal D/C date and time been discussed with patient:  Yes        Patient aox4. VSS. Immobilizer in place. SBA. CMS intact. Plan for [facility] to [facility] at 1640.

## 2023-04-28 NOTE — CONSULTS
Care Management Initial Consult    General Information  Assessment completed with: Patient, VM-chart review, Care Team Member,    Type of CM/SW Visit: Offer D/C Planning    Primary Care Provider verified and updated as needed:     Readmission within the last 30 days:        Reason for Consult: discharge planning  Advance Care Planning:            Communication Assessment  Patient's communication style: spoken language (English or Bilingual)             Cognitive  Cognitive/Neuro/Behavioral: WDL                      Living Environment:   People in home: alone     Current living Arrangements: independent living facility      Able to return to prior arrangements: yes       Family/Social Support:  Care provided by: self  Provides care for: no one  Marital Status: Single  Other (specify) (friends)          Description of Support System: Supportive, Involved    Support Assessment: Adequate family and caregiver support    Current Resources:   Patient receiving home care services: No     Community Resources: None  Equipment currently used at home: none  Supplies currently used at home:           Lifestyle & Psychosocial Needs:  Social Determinants of Health     Tobacco Use: Low Risk  (2/15/2021)    Patient History      Smoking Tobacco Use: Never      Smokeless Tobacco Use: Never      Passive Exposure: Not on file   Alcohol Use: Not on file   Financial Resource Strain: Not on file   Food Insecurity: Not on file   Transportation Needs: Not on file   Physical Activity: Not on file   Stress: Not on file   Social Connections: Not on file   Intimate Partner Violence: Not on file   Depression: Not at risk (2/17/2020)    PHQ-2      PHQ-2 Score: 0   Housing Stability: Not on file       Functional Status:  Prior to admission patient needed assistance:   Dependent ADLs:: Independent  Dependent IADLs:: Independent  Assesssment of Functional Status: Not at baseline with mobility, Not at baseline with ADL Functioning      Additional  Information:  SW met with pt to discuss discharge planning. [facility] and has priority due to living in a [facility] facility. Pt lives independ in all ADL and IADLs in senior living. Pt shares support/involvment from neighbors/friends.     Referrals sent. Spoke to [facility], they believe they can accept pt today.    Update: [facility] accepted. [facility] 4:40-5 pm today.  Waiting on ortho order.    Deanna Colindres, SARAH, LICSW, [facility]  Inpatient Care Coordination  [facility]  [phone]

## 2023-04-28 NOTE — PLAN OF CARE
Pt is A&O x4. VSS. Hypertensive. On RA. Complains of mild pain in her R arm. Takes scheduled PO Tylenol. IV SL. CMS intact. Sling on her R arm. Knee immobilizer on L  Leg. Sleeps between cares. From home alone. Will see SW to be discharged to TCU.

## 2023-04-28 NOTE — PROGRESS NOTES
Care Management Discharge Note    Discharge Date: 04/28/2023       Discharge Disposition: Transitional Care    Discharge Services:      Discharge DME:      Discharge Transportation: [facility]    Private pay costs discussed: transportation costs    PAS Confirmation Code: [id number]  Patient/family educated on Medicare website which has current facility and service quality ratings:      Education Provided on the Discharge Plan:  Yes   Persons Notified of Discharge Plans: patient  Patient/Family in Agreement with the Plan: yes    Handoff Referral Completed: Yes    Additional Information:  Pt discharging via [facility] today to [facility] between 4:40-5:00 pm.  Order sent.    Deanna Colindres, SARAH, LICSW, LADC  Inpatient Care Coordination  [facility]  [phone]

## 2023-04-28 NOTE — DISCHARGE SUMMARY
[facility]    Discharge Summary  Hospitalist    Date of Admission:  4/27/2023  Date of Discharge:  4/28/2023  Provider:  Pj Pena MD  Date of Service (when I last saw the patient): 04/28/23    Discharge Diagnoses   Mechanical fall  Right radial head fracture  Left patella fracture  Osteopenia     Other medical issues:  Past Medical History:   Diagnosis Date     Arthritis      Normal delivery 1966    no complications with pregnancy or delivery     Urinary incontinence        History of Present Illness   Romy Schwartz is an 77 year old female who presented with fall.  Please see the admission history and physical for full details.    Hospital Course   Romy Schwartz is a 77 year old female admitted on 4/27/2023 with left patella fracture and right distal radius fracture after mechanical fall. She has history of incontinence, arthritis, right humerus fracture with open reduction internal fixation, C4-5 fusion, osteopenia, and muscle tension headaches.  She presented to the emergency department earlier this morning for evaluation of left knee and right wrist pain after a fall.  She was glamping (glamorous camping) in [place], [place].  She tripped on a loose wood plank on a deck and fell, landing on her left knee and right elbow.  She did not hurt immediately after the fall but after driving home she had pain in her right elbow and left knee so she came to the emergency department for evaluation.  Aside from this she reported feeling well without fever, chills, chest pain, shortness of breath, orthopnea, dyspnea on exertion, abdominal pain, nausea, vomiting, dysuria, and diarrhea.  Emergency department evaluation showed elevated blood pressure but overall stable vital signs.  No labs were obtained.  X-ray of right elbow showed radial head fracture.  X-ray of left knee showed transverse fracture of the patella.  She was unable to ambulate independently.  She lives alone.  I was  asked to admit her to observation for pain control, orthopedic surgery evaluation, and assessment of mobility.  After consult the orthopedic surgeon has considered that she is a candidate for conservative treatment, has recommended to send her to rehab facility and follow-up in office.  Otherwise her hospital stay set been uneventful.     Problem list     Mechanical fall  Right radial head fracture  Left patella fracture  Osteopenia    # Discharge Pain Plan:    - Patient currently has been prescribed pain medications on discharge.  Oxycodone 5 mg for a total of 10 tablets.  It would be used as needed alternating with Tylenol.  MiraLAX has been prescribed to prevent constipation.  Plan has been discussed with the patient.  Goal to minimize pain secondary to recent fractures from mechanical fall      Significant Results and Procedures   See below     Pending Results      Unresulted Labs Ordered in the Past 30 Days of this Admission     No orders found for last 31 day(s).          Code Status   Full Code       Primary Care Physician   [facility]    GEN:  Alert, oriented x 3, appears comfortable, NAD.  HEENT:  Normocephalic/atraumatic, no scleral icterus, no nasal discharge, mouth moist.  CV:  Regular rate and rhythm, no murmur or JVD.  S1 + S2 noted, no S3 or S4.  LUNGS:  Clear to auscultation bilaterally without rales/rhonchi/wheezing/retractions.  Symmetric chest rise on inhalation noted.  ABD:  Active bowel sounds, soft, non-tender/non-distended.  No rebound/guarding/rigidity.  EXT:  No edema or cyanosis.  No joint synovitis noted.  SKIN:  Dry to touch, no exanthems noted in the visualized areas.     Discharge Disposition   Discharged to TCU    Consultations This Hospital Stay   PHYSICAL THERAPY ADULT IP CONSULT  OCCUPATIONAL THERAPY ADULT IP CONSULT  ORTHOPEDIC SURGERY IP CONSULT  CARE MANAGEMENT / SOCIAL WORK IP CONSULT  PHYSICAL THERAPY ADULT IP CONSULT  OCCUPATIONAL THERAPY ADULT IP  CONSULT    Time Spent on this Encounter   I, Pj Pena MD, personally saw the patient today and spent greater than 30 minutes discharging this patient.     Discharge Orders      Reason for your hospital stay    Right elbow, left patella fracture     Follow-up and recommended labs and tests    Follow-up with Dr Barros team at [facility] 2 weeks following your injury.   To arrange appointment or questions call: the care coordinator at [phone]  [facility] phone number: [phone]  [facility] phone number: [phone]  Walk-in clinic hours: 8am-8pm     Activity    Your activity upon discharge: WBAT Left LE with knee immobilizer applied at all times.  NON-WB right UE     When to contact your care team    Call your provider if you have any of the following: temperature greater than 101,  increased shortness of breath, increased drainage, redness or increasing calf pain/cramping.     Wound care and dressings    Instructions to care for your wound at home: Keep splint on Left UE at all times.  Do not immerse.  Knee immobilizer to be applied at all times.  Ok to remove for hygiene but knee must remain in full extension.     Discharge Instructions    No driving until approved by your surgeon     General info for SNF    Length of Stay Estimate: Short Term Care: Estimated # of Days <30  Condition at Discharge: Improving  Level of care:skilled   Rehabilitation Potential: Excellent  Admission H&P remains valid and up-to-date: Yes  Recent Chemotherapy: N/A   Use Nursing Home Standing Orders: Yes     Mantoux instructions    Give two-step Mantoux (PPD) Per Facility Policy Yes     Reason for your hospital stay    Fracture of the left patella and right radial head after accidental fall     Activity - Up with nursing assistance     Full Code     Physical Therapy Adult Consult    Evaluate and treat as clinically indicated.    Reason: Left patella fracture     Occupational Therapy Adult Consult     Evaluate and treat as clinically indicated.    Reason: Right radial head fracture     Fall precautions     Knee Supplies Order Knee Immobilizer; Left    Bracing Documentation:   Patient requires the use of the ordered bracing device due to following medical need/condition: left patella fracture.    I, the undersigned, certify that the above prescribed supplies are medically necessary for this patient and is both reasonable and necessary in reference to accepted standards of medical and necessary in reference to accepted standards of medical practice in the treatment of this patient's condition and is not prescribed as a convenience.     Diet    Follow this diet upon discharge: Orders Placed This Encounter      Regular Diet Adult     Diet    Follow this diet upon discharge: Orders Placed This Encounter      Regular Diet Adult      Diet     Discharge Medications   Current Discharge Medication List      START taking these medications    Details   acetaminophen (TYLENOL) 325 MG tablet Take 3 tablets (975 mg) by mouth every 8 hours    Associated Diagnoses: Closed nondisplaced comminuted fracture of left patella, initial encounter; Closed nondisplaced fracture of head of right radius, initial encounter      oxyCODONE (ROXICODONE) 5 MG tablet Take 1 tablet (5 mg) by mouth every 4 hours as needed for severe pain (IF pain not managed with non-pharmacological and non-opioid interventions)  Qty: 10 tablet, Refills: 0    Associated Diagnoses: Closed nondisplaced comminuted fracture of left patella, initial encounter; Closed nondisplaced fracture of head of right radius, initial encounter      polyethylene glycol (MIRALAX) 17 GM/Dose powder Take 17 g by mouth daily  Qty: 510 g    Associated Diagnoses: Closed nondisplaced comminuted fracture of left patella, initial encounter; Closed nondisplaced fracture of head of right radius, initial encounter         CONTINUE these medications which have NOT CHANGED    Details   Ascorbic  Acid (VITAMIN C) 500 MG CAPS Take 500 mg by mouth daily      aspirin 81 MG tablet Take 1 tablet by mouth daily.      calcium citrate-vitamin D (CITRACAL) 200-6.25 MG-MCG TABS per tablet Take 1 tablet by mouth daily      fish oil-omega-3 fatty acids 1000 MG capsule Take 1 g by mouth daily      multivitamin w/minerals (THERA-VIT-M) tablet Take 1 tablet by mouth daily      propranolol (INDERAL) 10 MG tablet Take 10 mg by mouth 3 times daily as needed (anixety)           Allergies   No Known Allergies  Data   Most Recent 3 CBC's:Recent Labs   Lab Test 04/28/23  0737 02/15/21  0908 02/17/20  0815 01/16/18  0819 01/08/17  1100   WBC 7.4  --   --   --  7.8   HGB 11.9 13.5 13.0   < > 14.0   MCV 95  --   --   --  94     --   --   --  279    < > = values in this interval not displayed.      Most Recent 3 BMP's:  Recent Labs   Lab Test 04/28/23  0737 02/15/21  0908 02/17/20  0815    135 138   POTASSIUM 3.8 4.6 4.0   CHLORIDE 105 105 106   CO2 26 30 30   BUN 9.7 13 12   CR 0.60 0.70 0.64   ANIONGAP 7 <1* 2*   GERALDINE 9.2 9.8 8.8   GLC 93 90 97     Most Recent 2 LFT's:  Recent Labs   Lab Test 02/15/21  0908 02/17/20  0815   AST 25 23   ALT 34 31   ALKPHOS 69 56   BILITOTAL 0.4 0.4     Most Recent INR's and Anticoagulation Dosing History:  Anticoagulation Dose History       View : No data to display.             Most Recent 3 Troponin's:No lab results found.  Most Recent Cholesterol Panel:  Recent Labs   Lab Test 02/15/21  0908   CHOL 244*   *   HDL 86   TRIG 105     Most Recent 6 Bacteria Isolates From Any Culture (See EPIC Reports for Culture Details):  Recent Labs   Lab Test 03/06/17  1427 06/08/16  1014   CULT No Beta Streptococcus isolated No Beta Streptococcus isolated     Most Recent TSH, T4 and A1c Labs:  Recent Labs   Lab Test 02/11/19  1143   TSH 3.04     Results for orders placed or performed during the hospital encounter of 04/27/23   Elbow XR, G/E 3 views, right    Narrative    EXAM: XR ELBOW RIGHT  G/E 3 VIEWS  LOCATION: [facility]  DATE/TIME: 4/27/2023 2:04 AM CDT    INDICATION: right elbow pain after fall  COMPARISON: CT on 12/03/2014.      Impression    IMPRESSION: Surgical plate and screws are again seen in the distal humerus and proximal ulna. No periprosthetic fractures or hardware loosening. A minimally displaced, comminuted fracture is present in the radial head with extension to the elbow joint,   which appears intact. Multiple chronic osseous fragments are again seen in the medial and lateral aspects of the elbow joint, unchanged.   XR Knee Left 3 Views    Narrative    EXAM: XR KNEE LEFT 3 VIEWS  LOCATION: [facility]  DATE/TIME: 4/27/2023 2:05 AM CDT    INDICATION: left knee pain fall  COMPARISON: None.      Impression    IMPRESSION: Osteopenia. There is a comminuted, minimally displaced, transverse fracture of the left patella. There is significant prepatellar edema. There is a large knee joint effusion. There are tricompartmental arthritic changes. There is meniscal   chondrocalcinosis which can be seen in pseudogout.         Disclaimer: This note consists of symbols derived from keyboarding, dictation and/or voice recognition software. As a result, there may be errors in the script that have gone undetected. Please consider this when interpreting information found in this chart.

## 2023-04-28 NOTE — PLAN OF CARE
Goal Outcome Evaluation:    Pt discharged to [facility]. Belonging and discharge packet sent with pt.

## 2023-04-29 LAB — SARS-COV-2 RNA RESP QL NAA+PROBE: NEGATIVE

## 2023-04-29 NOTE — PLAN OF CARE
Occupational Therapy Discharge Summary    Reason for therapy discharge:    Discharged to transitional care facility.    Progress towards therapy goal(s). See goals on Care Plan in [facility] electronic health record for goal details.  Goals not met.  Barriers to achieving goals:   discharge from facility.    Therapy recommendation(s):    Continued therapy is recommended.  Rationale/Recommendations:  recommend ongoing skilled OT at TCU to maximize indep and safety with ADL completion.

## 2023-04-30 ENCOUNTER — LAB REQUISITION (OUTPATIENT)
Dept: LAB | Facility: CLINIC | Age: 78
End: 2023-04-30
Payer: COMMERCIAL

## 2023-04-30 DIAGNOSIS — Z11.1 ENCOUNTER FOR SCREENING FOR RESPIRATORY TUBERCULOSIS: ICD-10-CM

## 2023-05-01 ENCOUNTER — LAB REQUISITION (OUTPATIENT)
Dept: LAB | Facility: CLINIC | Age: 78
End: 2023-05-01
Payer: COMMERCIAL

## 2023-05-01 ENCOUNTER — TRANSITIONAL CARE UNIT VISIT (OUTPATIENT)
Dept: GERIATRICS | Facility: CLINIC | Age: 78
End: 2023-05-01
Payer: COMMERCIAL

## 2023-05-01 VITALS
BODY MASS INDEX: 24.16 KG/M2 | RESPIRATION RATE: 16 BRPM | OXYGEN SATURATION: 99 % | TEMPERATURE: 98.2 F | HEART RATE: 65 BPM | HEIGHT: 65 IN | SYSTOLIC BLOOD PRESSURE: 150 MMHG | WEIGHT: 145 LBS | DIASTOLIC BLOOD PRESSURE: 70 MMHG

## 2023-05-01 DIAGNOSIS — G25.81 RESTLESS LEGS SYNDROME: ICD-10-CM

## 2023-05-01 DIAGNOSIS — G44.219 EPISODIC TENSION-TYPE HEADACHE, NOT INTRACTABLE: ICD-10-CM

## 2023-05-01 DIAGNOSIS — N39.41 URGE INCONTINENCE OF URINE: ICD-10-CM

## 2023-05-01 DIAGNOSIS — K59.01 SLOW TRANSIT CONSTIPATION: ICD-10-CM

## 2023-05-01 DIAGNOSIS — Z29.9 ENCOUNTER FOR PROPHYLACTIC MEASURES, UNSPECIFIED: ICD-10-CM

## 2023-05-01 DIAGNOSIS — S52.124D CLOSED NONDISPLACED FRACTURE OF HEAD OF RIGHT RADIUS WITH ROUTINE HEALING, SUBSEQUENT ENCOUNTER: ICD-10-CM

## 2023-05-01 DIAGNOSIS — M85.80 OSTEOPENIA, UNSPECIFIED LOCATION: ICD-10-CM

## 2023-05-01 DIAGNOSIS — S82.045D CLOSED NONDISPLACED COMMINUTED FRACTURE OF LEFT PATELLA WITH ROUTINE HEALING, SUBSEQUENT ENCOUNTER: Primary | ICD-10-CM
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
## 2023-05-01 DIAGNOSIS — D64.9 ACUTE ANEMIA: ICD-10-CM

## 2023-05-01 DIAGNOSIS — F41.9 ANXIETY: ICD-10-CM

## 2023-05-01 DIAGNOSIS — R53.81 PHYSICAL DECONDITIONING: ICD-10-CM

## 2023-05-01 PROCEDURE — U0003 INFECTIOUS AGENT DETECTION BY NUCLEIC ACID (DNA OR RNA); SEVERE ACUTE RESPIRATORY SYNDROME CORONAVIRUS 2 (SARS-COV-2) (CORONAVIRUS DISEASE [COVID-19]), AMPLIFIED PROBE TECHNIQUE, MAKING USE OF HIGH THROUGHPUT TECHNOLOGIES AS DESCRIBED BY CMS-2020-01-R: HCPCS | Performed by: NURSE PRACTITIONER

## 2023-05-01 PROCEDURE — 86481 TB AG RESPONSE T-CELL SUSP: CPT | Performed by: NURSE PRACTITIONER

## 2023-05-01 PROCEDURE — 36415 COLL VENOUS BLD VENIPUNCTURE: CPT | Performed by: NURSE PRACTITIONER

## 2023-05-01 PROCEDURE — 99310 SBSQ NF CARE HIGH MDM 45: CPT | Performed by: NURSE PRACTITIONER

## 2023-05-01 RX ORDER — MULTIVITAMIN WITH IRON
1 TABLET ORAL DAILY
Start: 2023-05-01

## 2023-05-01 RX ORDER — BISACODYL 10 MG
10 SUPPOSITORY, RECTAL RECTAL DAILY PRN
Start: 2023-05-01

## 2023-05-01 RX ORDER — POLYETHYLENE GLYCOL 3350 17 G/17G
17 POWDER, FOR SOLUTION ORAL DAILY PRN
Qty: 510 G
Start: 2023-05-01

## 2023-05-01 NOTE — PROGRESS NOTES
"[facility] GERIATRICS    PRIMARY CARE PROVIDER AND CLINIC:  Rivka [facility], [address] [address] [facility] / [facility] [address]  Chief Complaint   Patient presents with     Hospital F/U      [place] Medical Record Number:  [id number]  Place of Service where encounter took place:  [facility]  ([facility]) [[id number]]    Romy Schwartz  is a 77 year old  (1945), admitted to the above facility from  [facility]. Hospital stay 4/27/23 through 4/28/23..   HPI:    Past medical history significant for anxiety, tension type headache, urge incontinence of urine, osteopenia.    Patient was hospitalized at [facility][note 3] from 4/27 to 4/28 for right radial head fracture and left patella fracture secondary to mechanical fall.  X-ray to left knee shows osteopenia, comminuted minimally displaced transverse fracture of the left patella, significant prepatellar edema and large knee joint effusion, tricompartmental arthritic changes, meniscal chondrocalcinosis.  Right elbow shows no periprosthetic fractures or hardware loosening, minimally displaced comminuted fracture in the radial head with extension to the elbow joint which is intact.  Orthopedics was consulted and recommended non-weightbearing with sling for right elbow and weight-bear as tolerated in knee immobilizer to left lower extremity.  Patient's hemoglobin down to 11.9 from 13.5 checked several years ago. Discharged to [facility] for physical rehabilitation and medical management.     Reviewed hospital ER note, H&P, discharge summary, consultant notes from recent hospitalization.    Patient is seen today sitting up on the plan.  She is oriented x3.  She lives alone.  She reports overall pain has been managed.  She tells me her pain is currently an \"ache.\"  She would like oxycodone discontinued.  Her bowels have been moving okay, she uses suppository at home, does not like to use MiraLAX.  She is also using prune juice at bedtime.  " She finds this regimen effective.  Nursing staff is requesting an order for okay to self administer and for her to keep the bisacodyl at bedside.  We discussed use of baby aspirin, she reports this was started many years ago by her primary care provider.  She has no history of type 2 diabetes pulmonary artery disease or other indication that is currently recommended for aspirin, she would like to discontinue this today.  She is also requesting to start magnesium supplement daily which she uses for restless legs at home.  She denies shortness of breath, chest pain, dizziness/lightheadedness, dysuria/trouble voiding.  We discussed what to expect in the TCU.    Reviewed facility EMR including medications, recent nursing progress notes, vital signs.  Discussed patient with nursing staff including plan of care as below.    CODE STATUS/ADVANCE DIRECTIVES DISCUSSION:  Full Code  CPR/Full code   ALLERGIES: No Known Allergies   PAST MEDICAL HISTORY:   Past Medical History:   Diagnosis Date     Arthritis      Normal delivery 1966    no complications with pregnancy or delivery     Urinary incontinence       PAST SURGICAL HISTORY:   has a past surgical history that includes NONSPECIFIC PROCEDURE (1974); NONSPECIFIC PROCEDURE (12/99); TOOTH EXTRACTION W/FORCEP (1964); COLONOSCOPY THRU STOMA, DIAGNOSTIC (2/24/05); SPINE FUSION,ANTER,3 SGMTS (2007); and Open reduction internal fixation humerus distal (8/5/2014).  FAMILY HISTORY: family history includes Cancer in her brother and mother; Cancer - colorectal in her maternal grandmother; Family History Negative in her brother and brother; Heart Disease in her father; Lipids in her mother.  SOCIAL HISTORY:   reports that she has never smoked. She has never used smokeless tobacco. She reports current alcohol use. She reports that she does not use drugs.  Patient's living condition: lives alone    Post Discharge Medication Reconciliation Status:   MED REC REQUIRED  Post Medication  "Reconciliation Status:  Discharge medications reconciled and changed, see notes/orders         Current Outpatient Medications   Medication Sig     acetaminophen (TYLENOL) 325 MG tablet Take 3 tablets (975 mg) by mouth every 8 hours     Ascorbic Acid (VITAMIN C) 500 MG CAPS Take 500 mg by mouth daily     bisacodyl (DULCOLAX) 10 MG suppository Place 1 suppository (10 mg) rectally daily as needed for constipation     calcium citrate-vitamin D (CITRACAL) 200-6.25 MG-MCG TABS per tablet Take 1 tablet by mouth daily     fish oil-omega-3 fatty acids 1000 MG capsule Take 1 g by mouth daily     magnesium 250 MG tablet Take 1 tablet (250 mg) by mouth daily     multivitamin w/minerals (THERA-VIT-M) tablet Take 1 tablet by mouth daily     polyethylene glycol (MIRALAX) 17 GM/Dose powder Take 17 g by mouth daily as needed for constipation     propranolol (INDERAL) 10 MG tablet Take 10 mg by mouth 3 times daily as needed (anixety)     No current facility-administered medications for this visit.       ROS:  10 point ROS of systems including Constitutional, Eyes, Respiratory, Cardiovascular, Gastroenterology, Genitourinary, Integumentary, Musculoskeletal, Psychiatric were all negative except for pertinent positives noted in my HPI.    Vitals:  BP (!) 150/70   Pulse 65   Temp 98.2  F (36.8  C)   Resp 16   Ht 1.651 m (5' 5\")   Wt 65.8 kg (145 lb)   LMP 03/20/1995   SpO2 99%   BMI 24.13 kg/m    Exam:  GENERAL APPEARANCE:  Alert, in NAD  HEENT: normocephalic, moist mucous membranes, nose without drainage or crusting  RESP:  respiratory effort normal, no respiratory distress, Lung sounds clear, patient is on RA  CV: auscultation of heart done, rate and rhythm regular.  ABDOMEN: + bowel sounds, soft, nontender, no grimacing or guarding with palpation.  M/S: no lower extremity edema; right arm in splint -able to move hand freely- mild edema to right hand; left knee with immobilizer in place  SKIN:  Inspection and palpation of skin " and subcutaneous tissue: skin warm, dry without rashes  NEURO: cranial nerves 2-12 grossly intact and at patient's baseline; no facial asymmetry, no speech deficits and able to follow directions  PSYCH: oriented x 3, affect and mood normal      Lab/Diagnostic data:  L[facility] in SNF [facility]. Please refer to them using [facility]Care Everywhere. and Recent labs in [facility] reviewed by me today.     ASSESSMENT/PLAN:  Right radial head fracture  Left patella fracture  Osteopenia  Fall, subsequent encounter  -Ortho was consulted and recommended nonoperative management  -Pain managed  Plan: Per discussion with patient she would like oxycodone discontinued.  Continue pain management with Tylenol 975 mg every 8 hours. Continue calcium with vitamin d supplementation.  Nonweightbearing with sling for right elbow and weight-bear as tolerated in knee immobilizer to left lower extremity.  Therapy as below. Follow-up with Ortho in 2 weeks.    Acute anemia  -Secondary to fracture  -Hemoglobin 11.9 on 4/27, previously was 13 several years ago  Plan: CBC 5/4.    Anxiety  Chronic  Plan: Continue propanolol 10 mg 3 times daily as needed.  Monitor mood and symptoms.  Consider ACP referral as needed.    Restless Leg Syndrome  Chronic  Plan: Requests to start magnesium 250 mg daily- orders written at facility today    Tension type headache  Chronic  Plan: Tylenol as above. Monitor headaches.    Urge incontinence of urine  Chronic  Plan: Not currently on medication management.  Monitor symptoms.    Slow transit constipation  -Bowels moving regularly, requests bisacodyl PRN  Plan: Change MiraLAX 17 g daily PRN. Continue prune juice at bedtime per patient request and bisacodyl daily as needed-okay to leave at bedside and for patient to self administer.  Monitor bowels.    Physical deconditioning  Comment: Acute, secondary to recent hospitalization, medical conditions as above  Plan: Encourage participation in physical  therapy/occupational therapy for strengthening and deconditioning. Discharge planning per their recommendation. Social work to assist with d/c planning.    Also discontinued aspirin per patient request after discussion regarding current recommendations on primary prevention in older adults with use of aspirin.       Disclaimer: This note may contain text created using speech-recognition software and may contain unintended word substitutions.   Electronically signed by:  LEXIE Flowers CNP

## 2023-05-01 NOTE — PATIENT INSTRUCTIONS
Orders  Romy Schwartz  1945  1) Discontinue aspirin  2) Change miralax to 17 gram daily PRN for constipation  3) Start magnesium 250 mg daily for RLS.  4) Add to bisacodyl order- ok to keep at bedside and to self administer for constipation.  5) Nonweightbearing with sling for right elbow and weight-bear as tolerated in knee immobilizer to left lower extremity. Please complete skin checks under knee immobilizer q shift.  6) CBC 5/4. dX: anemia  7) Discontinue oxycodone  Madeline Ramirez, APRN CNP on 5/1/2023 at 1:32 PM

## 2023-05-02 LAB
GAMMA INTERFERON BACKGROUND BLD IA-ACNC: 0.02 IU/ML
M TB IFN-G BLD-IMP: NEGATIVE
M TB IFN-G CD4+ BCKGRND COR BLD-ACNC: 9.98 IU/ML
MITOGEN IGNF BCKGRD COR BLD-ACNC: 0 IU/ML
MITOGEN IGNF BCKGRD COR BLD-ACNC: 0 IU/ML
QUANTIFERON MITOGEN: 10 IU/ML
QUANTIFERON NIL TUBE: 0.02 IU/ML
QUANTIFERON TB1 TUBE: 0.02 IU/ML
QUANTIFERON TB2 TUBE: 0.02
SARS-COV-2 RNA RESP QL NAA+PROBE: NEGATIVE

## 2023-05-03 ENCOUNTER — LAB REQUISITION (OUTPATIENT)
Dept: LAB | Facility: CLINIC | Age: 78
End: 2023-05-03
Payer: COMMERCIAL

## 2023-05-03 DIAGNOSIS — D64.9 ANEMIA, UNSPECIFIED: ICD-10-CM

## 2023-05-03 NOTE — PROGRESS NOTES
[facility] GERIATRICS DISCHARGE SUMMARY  PATIENT'S NAME: Romy Schwartz  YOB: 1945  MEDICAL RECORD NUMBER:  [id number]  Place of Service where encounter took place:  [facility]  ([facility]) [[id number]]    PRIMARY CARE PROVIDER AND CLINIC RESPONSIBLE AFTER TRANSFER:   Rivka [facility], [address] [facility] / [facility] [address]    Non-[facility] Provider     Transferring providers: LEXIE Flowers CNP, Len Dupree MD  Recent Hospitalization/ED:  [facility] Hospital stay 4/27/23 to 4/28/23.  Date of SNF Admission: April 28, 2023  Date of SNF (anticipated) Discharge: May 06, 2023  Discharged to: home  Cognitive Scores: SLUMS: 28/30  Physical Function: patient is independent with grooming/hygiene, dressing upper and lower body, toileting, transfers.    CODE STATUS/ADVANCE DIRECTIVES DISCUSSION:  Full Code   ALLERGIES: Patient has no known allergies.    NURSING FACILITY COURSE     Past medical history significant for anxiety, tension type headache, urge incontinence of urine, osteopenia.     Patient was hospitalized at [facility] from 4/27 to 4/28 for right radial head fracture and left patella fracture secondary to mechanical fall.  X-ray to left knee shows osteopenia, comminuted minimally displaced transverse fracture of the left patella, significant prepatellar edema and large knee joint effusion, tricompartmental arthritic changes, meniscal chondrocalcinosis.  Right elbow shows no periprosthetic fractures or hardware loosening, minimally displaced comminuted fracture in the radial head with extension to the elbow joint which is intact.  Orthopedics was consulted and recommended non-weightbearing with sling for right elbow and weight-bear as tolerated in knee immobilizer to left lower extremity.  Patient's hemoglobin down to 11.9 from 13.5 checked several years ago. Discharged to [facility] for physical rehabilitation and medical management.       Summary of nursing facility stay:   Today, patient was seen sitting up in bed.  She is nervous about going home.  She reports this morning she is having a hard time getting comfortable due to pain.  Declines wanting to try oxycodone.  Is agreeable to trial of muscle relaxer, she would like to try here at the facility prior to discharging home to see how she tolerates it.  She otherwise feels well, denies shortness of breath, chest pain, dizziness/lightheadedness.  Reports bowels are moving regularly.  She denies any dysuria trouble voiding.    Right radial head fracture  Left patella fracture  Osteopenia  Fall, subsequent encounter  -Ortho was consulted and recommended nonoperative management  -with increased pain today, declines wanting to use oxycodone, is agreeable to trial of methocarbamol, would like to try here before discharging home.  Plan:  Discussed trial of methocarbamol and close monitoring for side effects with nursing.  Start methocarbamol 500 mg 4 times daily as needed..  Continue pain management with Tylenol 975 mg every 8 hours. Continue calcium with vitamin d supplementation.  Nonweightbearing with sling for right elbow and weight-bear as tolerated in knee immobilizer to left lower extremity.  Therapy as below. Follow-up with Ortho in 2 weeks.     Acute anemia  -Secondary to fracture  -Hemoglobin 11.9 on 4/27, previously was 13 several years ago  Plan: CBC pending today     Anxiety  Chronic  Plan: Continue propanolol 10 mg 3 times daily as needed.  Monitor mood and symptoms.  Consider ACP referral as needed.     Restless Leg Syndrome  Chronic  Plan: Requests to start magnesium 250 mg daily- orders written at facility today     Tension type headache  Chronic  Plan: Tylenol as above. Monitor headaches.     Urge incontinence of urine  Chronic  Plan: Not currently on medication management.  Monitor symptoms.     Slow transit constipation  -Bowels moving regularly, requested bisacodyl PRN  Plan:  "Continue MiraLAX 17 g daily PRN, prune juice at bedtime per patient request and bisacodyl daily as needed.  Monitor bowels.     Physical deconditioning  Comment: Acute, secondary to recent hospitalization, medical conditions as above  -Completed course of therapy in the TCU  Plan: Continue therapy through home care.      Also discontinued aspirin in the TCU per patient request after discussion regarding current recommendations on primary prevention in older adults with use of aspirin.    Discharge Medications:  MED REC REQUIRED  Post Medication Reconciliation Status:  Medication reconciliation previously completed during another office visit      Current Outpatient Medications   Medication Sig Dispense Refill     acetaminophen (TYLENOL) 325 MG tablet Take 3 tablets (975 mg) by mouth every 8 hours       Ascorbic Acid (VITAMIN C) 500 MG CAPS Take 500 mg by mouth daily       bisacodyl (DULCOLAX) 10 MG suppository Place 1 suppository (10 mg) rectally daily as needed for constipation       calcium citrate-vitamin D (CITRACAL) 200-6.25 MG-MCG TABS per tablet Take 1 tablet by mouth daily       fish oil-omega-3 fatty acids 1000 MG capsule Take 1 g by mouth daily       magnesium 250 MG tablet Take 1 tablet (250 mg) by mouth daily       methocarbamol (ROBAXIN) 500 MG tablet Take 1 tablet (500 mg) by mouth 4 times daily as needed for muscle spasms       multivitamin w/minerals (THERA-VIT-M) tablet Take 1 tablet by mouth daily       polyethylene glycol (MIRALAX) 17 GM/Dose powder Take 17 g by mouth daily as needed for constipation 510 g      propranolol (INDERAL) 10 MG tablet Take 10 mg by mouth 3 times daily as needed (anixety)           Past Medical History:   Past Medical History:   Diagnosis Date     Arthritis      Normal delivery 1966    no complications with pregnancy or delivery     Urinary incontinence      Physical Exam:   Vitals: /68   Pulse 73   Temp 98.3  F (36.8  C)   Resp 16   Ht 1.651 m (5' 5\")   Wt " 65.8 kg (145 lb)   LMP 03/20/1995   SpO2 94%   BMI 24.13 kg/m    BMI: Body mass index is 24.13 kg/m .  GENERAL APPEARANCE:  Alert, in NAD  HEENT: normocephalic, moist mucous membranes, nose without drainage or crusting  RESP:  respiratory effort normal, no respiratory distress, Lung sounds clear, patient is on RA  CV: auscultation of heart done, rate and rhythm regular  ABDOMEN: + bowel sounds, soft, nontender, no grimacing or guarding with palpation.  M/S: knee immobilizer to left knee, right arm in splint with sling in place- no swelling noted to bilateral lower extremities, able to move fingers freely  NEURO: cranial nerves 2-12 grossly intact and at patient's baseline  PSYCH: oriented x 3,affect and mood normal      SNF labs: Labs done in SNF are in [facility]. Please refer to them using [facility] Everywhere. and Recent labs in [facility] reviewed by me today.     DISCHARGE PLAN:    Medical Follow Up:      Follow up with primary care provider in 1-2 weeks  Follow up with specialist : ortho- 2 weeks from hospital stay     Discharge Services: Home Care:  Occupational Therapy, Physical Therapy, Registered Nurse, Home Health Aide and From:  [facility]    Discharge Instructions Verbalized to Patient at Discharge:     Keep splint on RIGHT UE at all times. Do not immerse. Knee immobilizer to be applied at all times to LLE. OK to remove for hygiene but knee must remain in full extension.    NO driving until approved by surgeon    NWB with sling to right elbow, WBAT in knee immobilizer to LLE      TOTAL DISCHARGE TIME:   Greater than 30 minutes  Electronically signed by:  LEXIE Flowers CNP     Home care Face to Face documentation done in [facility] attached to Home care orders for [facility].

## 2023-05-04 ENCOUNTER — LAB REQUISITION (OUTPATIENT)
Dept: LAB | Facility: CLINIC | Age: 78
End: 2023-05-04
Payer: COMMERCIAL

## 2023-05-04 ENCOUNTER — DISCHARGE SUMMARY NURSING HOME (OUTPATIENT)
Dept: GERIATRICS | Facility: CLINIC | Age: 78
End: 2023-05-04
Payer: COMMERCIAL

## 2023-05-04 VITALS
HEART RATE: 73 BPM | DIASTOLIC BLOOD PRESSURE: 68 MMHG | RESPIRATION RATE: 16 BRPM | TEMPERATURE: 98.3 F | WEIGHT: 145 LBS | OXYGEN SATURATION: 94 % | SYSTOLIC BLOOD PRESSURE: 112 MMHG | HEIGHT: 65 IN | BODY MASS INDEX: 24.16 KG/M2

## 2023-05-04 DIAGNOSIS — G44.219 EPISODIC TENSION-TYPE HEADACHE, NOT INTRACTABLE: ICD-10-CM

## 2023-05-04 DIAGNOSIS — D64.9 ACUTE ANEMIA: ICD-10-CM

## 2023-05-04 DIAGNOSIS — G25.81 RESTLESS LEGS SYNDROME: ICD-10-CM

## 2023-05-04 DIAGNOSIS — S82.045D CLOSED NONDISPLACED COMMINUTED FRACTURE OF LEFT PATELLA WITH ROUTINE HEALING, SUBSEQUENT ENCOUNTER: Primary | ICD-10-CM

## 2023-05-04 DIAGNOSIS — S52.124D CLOSED NONDISPLACED FRACTURE OF HEAD OF RIGHT RADIUS WITH ROUTINE HEALING, SUBSEQUENT ENCOUNTER: ICD-10-CM

## 2023-05-04 DIAGNOSIS — N39.41 URGE INCONTINENCE OF URINE: ICD-10-CM

## 2023-05-04 DIAGNOSIS — K59.01 SLOW TRANSIT CONSTIPATION: ICD-10-CM

## 2023-05-04 DIAGNOSIS — U07.1 COVID-19: ICD-10-CM

## 2023-05-04 DIAGNOSIS — F41.9 ANXIETY: ICD-10-CM
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
## 2023-05-04 DIAGNOSIS — R53.81 PHYSICAL DECONDITIONING: ICD-10-CM

## 2023-05-04 DIAGNOSIS — M85.80 OSTEOPENIA, UNSPECIFIED LOCATION: ICD-10-CM

## 2023-05-04 LAB
ERYTHROCYTE [DISTWIDTH] IN BLOOD BY AUTOMATED COUNT: 13.3 % (ref 10–15)
HCT VFR BLD AUTO: 39 % (ref 35–47)
HGB BLD-MCNC: 12.6 G/DL (ref 11.7–15.7)
MCH RBC QN AUTO: 31.1 PG (ref 26.5–33)
MCHC RBC AUTO-ENTMCNC: 32.3 G/DL (ref 31.5–36.5)
MCV RBC AUTO: 96 FL (ref 78–100)
PLATELET # BLD AUTO: 355 10E3/UL (ref 150–450)
RBC # BLD AUTO: 4.05 10E6/UL (ref 3.8–5.2)
WBC # BLD AUTO: 6.6 10E3/UL (ref 4–11)

## 2023-05-04 PROCEDURE — 85027 COMPLETE CBC AUTOMATED: CPT | Performed by: NURSE PRACTITIONER

## 2023-05-04 PROCEDURE — 36415 COLL VENOUS BLD VENIPUNCTURE: CPT | Performed by: NURSE PRACTITIONER

## 2023-05-04 PROCEDURE — 99316 NF DSCHRG MGMT 30 MIN+: CPT | Performed by: NURSE PRACTITIONER

## 2023-05-04 PROCEDURE — U0003 INFECTIOUS AGENT DETECTION BY NUCLEIC ACID (DNA OR RNA); SEVERE ACUTE RESPIRATORY SYNDROME CORONAVIRUS 2 (SARS-COV-2) (CORONAVIRUS DISEASE [COVID-19]), AMPLIFIED PROBE TECHNIQUE, MAKING USE OF HIGH THROUGHPUT TECHNOLOGIES AS DESCRIBED BY CMS-2020-01-R: HCPCS | Performed by: NURSE PRACTITIONER

## 2023-05-04 RX ORDER — METHOCARBAMOL 500 MG/1
500 TABLET, FILM COATED ORAL 4 TIMES DAILY PRN
Start: 2023-05-04

## 2023-05-04 NOTE — PATIENT INSTRUCTIONS
Orders  Romy Schwartz  1945  1) Start methocarbamol 500 mg QID PRN for pain. Monitor for side effects and update provider if present  2) OK to discharge home with current medications, orders, and treatment plan. OK for 30 day supply of medications at discharge. Follow up with PCP in 1-2 weeks. Follow up with specialists : ortho 2 weeks from hospital discharge. Home care to include physical therapy, occupational therapy, RN, HHA.  LEXIE Flowers CNP on 5/4/2023 at 10:38 AM

## 2023-05-05 LAB — SARS-COV-2 RNA RESP QL NAA+PROBE: NEGATIVE

## 2023-05-15 ENCOUNTER — TRANSFERRED RECORDS (OUTPATIENT)
Dept: HEALTH INFORMATION MANAGEMENT | Facility: CLINIC | Age: 78
End: 2023-05-15
Payer: COMMERCIAL

## 2023-06-08 ENCOUNTER — TRANSFERRED RECORDS (OUTPATIENT)
Dept: HEALTH INFORMATION MANAGEMENT | Facility: CLINIC | Age: 78
End: 2023-06-08
Payer: COMMERCIAL

## 2023-08-30 ENCOUNTER — TRANSFERRED RECORDS (OUTPATIENT)
Dept: HEALTH INFORMATION MANAGEMENT | Facility: CLINIC | Age: 78
End: 2023-08-30
Payer: COMMERCIAL

## 2024-02-13 ENCOUNTER — TRANSFERRED RECORDS (OUTPATIENT)
Dept: HEALTH INFORMATION MANAGEMENT | Facility: CLINIC | Age: 79
End: 2024-02-13
Payer: COMMERCIAL

## 2024-06-02 ENCOUNTER — HEALTH MAINTENANCE LETTER (OUTPATIENT)
Age: 79
End: 2024-06-02

## 2024-12-26 ENCOUNTER — TRANSFERRED RECORDS (OUTPATIENT)
Dept: HEALTH INFORMATION MANAGEMENT | Facility: CLINIC | Age: 79
End: 2024-12-26
Payer: COMMERCIAL

## 2025-01-25 ENCOUNTER — HOSPITAL ENCOUNTER (EMERGENCY)
Facility: CLINIC | Age: 80
Discharge: HOME | End: 2025-01-25
Attending: EMERGENCY MEDICINE | Admitting: EMERGENCY MEDICINE
Payer: COMMERCIAL

## 2025-01-25 ENCOUNTER — APPOINTMENT (OUTPATIENT)
Dept: GENERAL RADIOLOGY | Facility: CLINIC | Age: 80
End: 2025-01-25
Attending: EMERGENCY MEDICINE
Payer: COMMERCIAL

## 2025-01-25 VITALS
BODY MASS INDEX: 22.34 KG/M2 | HEIGHT: 66 IN | OXYGEN SATURATION: 96 % | TEMPERATURE: 97.9 F | RESPIRATION RATE: 18 BRPM | WEIGHT: 139 LBS | DIASTOLIC BLOOD PRESSURE: 123 MMHG | HEART RATE: 64 BPM | SYSTOLIC BLOOD PRESSURE: 174 MMHG

## 2025-01-25 DIAGNOSIS — R07.9 CHEST PAIN, UNSPECIFIED TYPE: ICD-10-CM

## 2025-01-25 LAB
ALBUMIN SERPL BCG-MCNC: 4 G/DL (ref 3.5–5.2)
ALP SERPL-CCNC: 57 U/L (ref 40–150)
ALT SERPL W P-5'-P-CCNC: 27 U/L (ref 0–50)
ANION GAP SERPL CALCULATED.3IONS-SCNC: 11 MMOL/L (ref 7–15)
AST SERPL W P-5'-P-CCNC: 31 U/L (ref 0–45)
BASOPHILS # BLD AUTO: 0.1 10E3/UL (ref 0–0.2)
BASOPHILS NFR BLD AUTO: 1 %
BILIRUB SERPL-MCNC: 0.3 MG/DL
BUN SERPL-MCNC: 15.5 MG/DL (ref 8–23)
CALCIUM SERPL-MCNC: 9.3 MG/DL (ref 8.8–10.4)
CHLORIDE SERPL-SCNC: 102 MMOL/L (ref 98–107)
CREAT SERPL-MCNC: 0.73 MG/DL (ref 0.51–0.95)
D DIMER PPP FEU-MCNC: 0.36 UG/ML FEU (ref 0–0.5)
EGFRCR SERPLBLD CKD-EPI 2021: 83 ML/MIN/1.73M2
EOSINOPHIL # BLD AUTO: 0.4 10E3/UL (ref 0–0.7)
EOSINOPHIL NFR BLD AUTO: 6 %
ERYTHROCYTE [DISTWIDTH] IN BLOOD BY AUTOMATED COUNT: 13 % (ref 10–15)
GLUCOSE SERPL-MCNC: 98 MG/DL (ref 70–99)
HCO3 SERPL-SCNC: 25 MMOL/L (ref 22–29)
HCT VFR BLD AUTO: 37.9 % (ref 35–47)
HGB BLD-MCNC: 12.6 G/DL (ref 11.7–15.7)
HOLD SPECIMEN: NORMAL
IMM GRANULOCYTES # BLD: 0 10E3/UL
IMM GRANULOCYTES NFR BLD: 0 %
LYMPHOCYTES # BLD AUTO: 2.6 10E3/UL (ref 0.8–5.3)
LYMPHOCYTES NFR BLD AUTO: 42 %
MCH RBC QN AUTO: 31.2 PG (ref 26.5–33)
MCHC RBC AUTO-ENTMCNC: 33.2 G/DL (ref 31.5–36.5)
MCV RBC AUTO: 94 FL (ref 78–100)
MONOCYTES # BLD AUTO: 0.7 10E3/UL (ref 0–1.3)
MONOCYTES NFR BLD AUTO: 11 %
NEUTROPHILS # BLD AUTO: 2.5 10E3/UL (ref 1.6–8.3)
NEUTROPHILS NFR BLD AUTO: 40 %
NRBC # BLD AUTO: 0 10E3/UL
NRBC BLD AUTO-RTO: 0 /100
PLATELET # BLD AUTO: 285 10E3/UL (ref 150–450)
POTASSIUM SERPL-SCNC: 4.7 MMOL/L (ref 3.4–5.3)
PROT SERPL-MCNC: 6.7 G/DL (ref 6.4–8.3)
RBC # BLD AUTO: 4.04 10E6/UL (ref 3.8–5.2)
SODIUM SERPL-SCNC: 138 MMOL/L (ref 135–145)
TROPONIN T SERPL HS-MCNC: 7 NG/L
TROPONIN T SERPL HS-MCNC: 8 NG/L
WBC # BLD AUTO: 6.2 10E3/UL (ref 4–11)

## 2025-01-25 PROCEDURE — 99285 EMERGENCY DEPT VISIT HI MDM: CPT | Mod: 25

## 2025-01-25 PROCEDURE — 93005 ELECTROCARDIOGRAM TRACING: CPT

## 2025-01-25 PROCEDURE — 84484 ASSAY OF TROPONIN QUANT: CPT | Performed by: EMERGENCY MEDICINE

## 2025-01-25 PROCEDURE — 85379 FIBRIN DEGRADATION QUANT: CPT | Performed by: EMERGENCY MEDICINE

## 2025-01-25 PROCEDURE — 250N000013 HC RX MED GY IP 250 OP 250 PS 637: Performed by: EMERGENCY MEDICINE

## 2025-01-25 PROCEDURE — 71046 X-RAY EXAM CHEST 2 VIEWS: CPT

## 2025-01-25 PROCEDURE — 36415 COLL VENOUS BLD VENIPUNCTURE: CPT | Performed by: EMERGENCY MEDICINE

## 2025-01-25 PROCEDURE — 84155 ASSAY OF PROTEIN SERUM: CPT | Performed by: EMERGENCY MEDICINE

## 2025-01-25 PROCEDURE — 82565 ASSAY OF CREATININE: CPT | Performed by: EMERGENCY MEDICINE

## 2025-01-25 PROCEDURE — 85049 AUTOMATED PLATELET COUNT: CPT | Performed by: EMERGENCY MEDICINE

## 2025-01-25 PROCEDURE — 85004 AUTOMATED DIFF WBC COUNT: CPT | Performed by: EMERGENCY MEDICINE

## 2025-01-25 RX ORDER — ACETAMINOPHEN 500 MG
1000 TABLET ORAL EVERY 4 HOURS PRN
Status: DISCONTINUED | OUTPATIENT
Start: 2025-01-25 | End: 2025-01-25 | Stop reason: HOSPADM

## 2025-01-25 RX ORDER — NITROGLYCERIN 0.4 MG/1
0.4 TABLET SUBLINGUAL ONCE
Status: COMPLETED | OUTPATIENT
Start: 2025-01-25 | End: 2025-01-25

## 2025-01-25 RX ADMIN — NITROGLYCERIN 0.4 MG: 0.4 TABLET SUBLINGUAL at 06:50

## 2025-01-25 RX ADMIN — ACETAMINOPHEN 1000 MG: 500 TABLET ORAL at 08:48

## 2025-01-25 ASSESSMENT — ACTIVITIES OF DAILY LIVING (ADL)
ADLS_ACUITY_SCORE: 47

## 2025-01-25 NOTE — ED PROVIDER NOTES
Emergency Department Note      History of Present Illness     Chief Complaint   Chest Pain      HPI   Romy Schwartz is a 79 year old female who says she awoke shortly prior to arrival with a heaviness in the anterior chest.  This has been constant since onset.  She received sublingual nitroglycerin from EMS without relief.  She took 325 mg of aspirin at home.  She is not aware of any provoking factors.  She says she does have prior panic attacks which have caused chest pain.  She says before when a bed last night she was feeling worried as some of her friends recently .  She took a dose of propranolol.  She did have a fall 2 days ago and landed on her left side but does not believe she struck her chest.  She does not typically get GERD symptoms.  No radiation of pain to her back.  She is not short of breath.  No history of DVT or PE.  No leg swelling or hemoptysis.  No recent travel.  She denies any recent URI symptoms or fever.  She has not had any recent provocative cardiac workup.  No recent changes to her diet or medications.    Independent Historian   History taken from EMS who note the onset of the patient's pain, normal EKG, and lack of response to nitroglycerin.    Review of External Notes   Cardiology notes reviewed from May 18, 2022.  The patient was seen for palpitations at that time.    Past Medical History     Medical History and Problem List   Arthritis   Urinary incontinence     Medications   Dulcolax   Robaxin   Inderal     Surgical History   Open reduction internal fixation humerus distal   Spinal fusion   Colonoscopy     Physical Exam     Patient Vitals for the past 24 hrs:   BP Temp Temp src Pulse Resp SpO2 Height Weight   25 0945 (!) 174/123 -- -- 64 18 96 % -- --   25 0900 -- -- -- 58 18 98 % -- --   25 0845 (!) 153/74 -- -- 59 18 98 % -- --   25 0748 (!) 142/83 -- -- 57 18 96 % -- --   25 0718 (!) 152/67 -- -- 56 (!) 7 98 % -- --   25 0715 139/71 --  "-- 58 18 96 % -- --   01/25/25 0655 -- -- -- 61 -- 97 % -- --   01/25/25 0653 (!) 141/76 -- -- -- -- -- -- --   01/25/25 0652 -- -- -- -- -- -- 1.676 m (5' 6\") 63 kg (139 lb)   01/25/25 0650 -- -- -- 58 10 98 % -- --   01/25/25 0645 (!) 187/86 -- -- 62 10 98 % -- --   01/25/25 0640 (!) 187/92 -- -- 64 11 99 % -- --   01/25/25 0639 -- 97.9  F (36.6  C) Oral -- -- -- -- --     Physical Exam  Constitutional:       General: She is not in acute distress.     Appearance: Normal appearance. She is not diaphoretic.   HENT:      Head: Atraumatic.      Right Ear: External ear normal.      Left Ear: External ear normal.      Mouth/Throat:      Mouth: Mucous membranes are moist.   Eyes:      General: No scleral icterus.     Conjunctiva/sclera: Conjunctivae normal.   Cardiovascular:      Rate and Rhythm: Normal rate and regular rhythm.      Heart sounds: Normal heart sounds.   Pulmonary:      Effort: No respiratory distress.      Breath sounds: Normal breath sounds.   Abdominal:      General: Abdomen is flat. There is no distension.      Tenderness: There is no abdominal tenderness.   Musculoskeletal:      Cervical back: Neck supple.      Right lower leg: No edema.      Left lower leg: No edema.   Skin:     General: Skin is warm.      Capillary Refill: Capillary refill takes less than 2 seconds.      Findings: No rash.   Neurological:      General: No focal deficit present.      Mental Status: She is alert and oriented to person, place, and time.   Psychiatric:         Mood and Affect: Mood normal.         Behavior: Behavior normal.           Diagnostics     Lab Results   Labs Ordered and Resulted from Time of ED Arrival to Time of ED Departure   COMPREHENSIVE METABOLIC PANEL - Normal       Result Value    Sodium 138      Potassium 4.7      Carbon Dioxide (CO2) 25      Anion Gap 11      Urea Nitrogen 15.5      Creatinine 0.73      GFR Estimate 83      Calcium 9.3      Chloride 102      Glucose 98      Alkaline Phosphatase 57   "    AST 31      ALT 27      Protein Total 6.7      Albumin 4.0      Bilirubin Total 0.3     D DIMER QUANTITATIVE - Normal    D-Dimer Quantitative 0.36     TROPONIN T, HIGH SENSITIVITY - Normal    Troponin T, High Sensitivity 8     TROPONIN T, HIGH SENSITIVITY - Normal    Troponin T, High Sensitivity 7     CBC WITH PLATELETS AND DIFFERENTIAL    WBC Count 6.2      RBC Count 4.04      Hemoglobin 12.6      Hematocrit 37.9      MCV 94      MCH 31.2      MCHC 33.2      RDW 13.0      Platelet Count 285      % Neutrophils 40      % Lymphocytes 42      % Monocytes 11      % Eosinophils 6      % Basophils 1      % Immature Granulocytes 0      NRBCs per 100 WBC 0      Absolute Neutrophils 2.5      Absolute Lymphocytes 2.6      Absolute Monocytes 0.7      Absolute Eosinophils 0.4      Absolute Basophils 0.1      Absolute Immature Granulocytes 0.0      Absolute NRBCs 0.0         Imaging   XR Chest 2 Views   Final Result   IMPRESSION: Normal heart size and pulmonary vascularity. Lungs are clear. No pleural effusions. Minimal degenerative changes in the thoracic spine.          EKG   ECG taken at 0636, ECG read at 0636  Normal sinus rhythm   No changes as compared to prior, dated 1/07/18.  Rate 64 bpm. ID interval 152 ms. QRS duration 72 ms. QT/QTc 396/408 ms. P-R-T axes 70 35 10.    Independent Interpretation   Chest x-ray independently interpreted.  No pneumothorax.    ED Course      Medications Administered   Medications   nitroGLYcerin (NITROSTAT) sublingual tablet 0.4 mg (0.4 mg Sublingual $Given 1/25/25 0650)       ED Course   ED Course as of 01/25/25 1402   Sat Jan 25, 2025   0632 I obtained history and examined the patient as noted above.      Medical Decision Making / Diagnosis     KASHIF   Romy Schwartz is a 79 year old female who presents to the ED with chest discomfort.  Her symptoms are not typical for acute coronary syndrome.  They are not exertional.  There is no response to nitroglycerin.  She does not have  significant risk factors other than family history.  EKG negative.  2 troponins negative.  She does not require admission but given her age will be referred to cardiology for consideration of further workup.    D-dimer is negative.  There is no radiation of the pain to the back of the mediastinum is narrow in the chest x-ray.  No evidence of pneumonia.    Patient did have a fall a couple of days ago so potentially is having some chest wall discomfort.  Her pain is resolved though prior to discharge and she is appropriate for outpatient management.  We discussed return precautions.    Disposition   The patient was discharged.     Diagnosis     ICD-10-CM    1. Chest pain, unspecified type  R07.9 Follow-Up with Cardiology           Scribe Disclosure:  I, Linda Keith, am serving as a scribe at 6:53 AM on 1/25/2025 to document services personally performed by Suhas eBebe MD based on my observations and the provider's statements to me.        Suhas Beebe MD  01/25/25 [id number]

## 2025-01-25 NOTE — ED TRIAGE NOTES
Pt comes via EMS from home-Pt woke up around 0530 with 3/10 chest pressure. Pt took 325mg of aspirin at home and was given 0.1 nitroglycerin by EMS. EMS had reported BP in 200s Systolic after nitroglycerin 190s systolic.

## 2025-01-27 LAB
ATRIAL RATE - MUSE: 64 BPM
DIASTOLIC BLOOD PRESSURE - MUSE: NORMAL MMHG
INTERPRETATION ECG - MUSE: NORMAL
P AXIS - MUSE: 70 DEGREES
PR INTERVAL - MUSE: 152 MS
QRS DURATION - MUSE: 72 MS
QT - MUSE: 396 MS
QTC - MUSE: 408 MS
R AXIS - MUSE: 35 DEGREES
SYSTOLIC BLOOD PRESSURE - MUSE: NORMAL MMHG
T AXIS - MUSE: 10 DEGREES
VENTRICULAR RATE- MUSE: 64 BPM

## 2025-05-12 ENCOUNTER — TRANSFERRED RECORDS (OUTPATIENT)
Dept: HEALTH INFORMATION MANAGEMENT | Facility: CLINIC | Age: 80
End: 2025-05-12
Payer: COMMERCIAL

## 2025-06-14 ENCOUNTER — HEALTH MAINTENANCE LETTER (OUTPATIENT)
Age: 80
End: 2025-06-14